# Patient Record
Sex: FEMALE | Race: WHITE | NOT HISPANIC OR LATINO | Employment: FULL TIME | URBAN - METROPOLITAN AREA
[De-identification: names, ages, dates, MRNs, and addresses within clinical notes are randomized per-mention and may not be internally consistent; named-entity substitution may affect disease eponyms.]

---

## 2016-07-14 LAB
EXTERNAL ANTIBODY SCREEN: NEGATIVE
EXTERNAL HEPATITIS B SURFACE ANTIGEN: NEGATIVE
EXTERNAL RUBELLA QUALITATIVE: NORMAL
EXTERNAL SYPHILIS RPR SCREEN: NORMAL
HIV1 AB SPEC QL IA.RAPID: NEGATIVE

## 2016-12-29 LAB — EXTERNAL GROUP B STREP ANTIGEN: NEGATIVE

## 2017-01-03 ENCOUNTER — PROCEDURE VISIT (OUTPATIENT)
Dept: OBSTETRICS AND GYNECOLOGY | Facility: CLINIC | Age: 27
End: 2017-01-03

## 2017-01-03 ENCOUNTER — ROUTINE PRENATAL (OUTPATIENT)
Dept: OBSTETRICS AND GYNECOLOGY | Facility: CLINIC | Age: 27
End: 2017-01-03

## 2017-01-03 VITALS — DIASTOLIC BLOOD PRESSURE: 67 MMHG | SYSTOLIC BLOOD PRESSURE: 108 MMHG | WEIGHT: 167 LBS | BODY MASS INDEX: 29.58 KG/M2

## 2017-01-03 DIAGNOSIS — O35.10X0 CHROMOSOMAL ABNORMALITY IN FETUS AFFECTING CARE OF MOTHER, NOT APPLICABLE OR UNSPECIFIED FETUS: ICD-10-CM

## 2017-01-03 DIAGNOSIS — O36.5930 IUGR (INTRAUTERINE GROWTH RESTRICTION) AFFECTING CARE OF MOTHER, THIRD TRIMESTER, NOT APPLICABLE OR UNSPECIFIED FETUS: Primary | ICD-10-CM

## 2017-01-03 DIAGNOSIS — Z34.83 ENCOUNTER FOR SUPERVISION OF OTHER NORMAL PREGNANCY IN THIRD TRIMESTER: Primary | ICD-10-CM

## 2017-01-03 DIAGNOSIS — O36.5910 IUGR (INTRAUTERINE GROWTH RESTRICTION) AFFECTING CARE OF MOTHER, FIRST TRIMESTER, NOT APPLICABLE OR UNSPECIFIED FETUS: Primary | ICD-10-CM

## 2017-01-03 DIAGNOSIS — O36.5910 IUGR (INTRAUTERINE GROWTH RESTRICTION) AFFECTING CARE OF MOTHER, FIRST TRIMESTER, NOT APPLICABLE OR UNSPECIFIED FETUS: ICD-10-CM

## 2017-01-03 PROCEDURE — 76819 FETAL BIOPHYS PROFIL W/O NST: CPT | Performed by: OBSTETRICS & GYNECOLOGY

## 2017-01-03 PROCEDURE — 99213 OFFICE O/P EST LOW 20 MIN: CPT | Performed by: OBSTETRICS & GYNECOLOGY

## 2017-01-03 PROCEDURE — 76820 UMBILICAL ARTERY ECHO: CPT | Performed by: OBSTETRICS & GYNECOLOGY

## 2017-01-03 RX ORDER — SODIUM CHLORIDE 0.9 % (FLUSH) 0.9 %
1-10 SYRINGE (ML) INJECTION AS NEEDED
Status: CANCELLED | OUTPATIENT
Start: 2017-01-03

## 2017-01-03 RX ORDER — METHYLERGONOVINE MALEATE 0.2 MG/ML
200 INJECTION INTRAVENOUS AS NEEDED
Status: CANCELLED | OUTPATIENT
Start: 2017-01-03

## 2017-01-03 RX ORDER — CARBOPROST TROMETHAMINE 250 UG/ML
250 INJECTION, SOLUTION INTRAMUSCULAR AS NEEDED
Status: CANCELLED | OUTPATIENT
Start: 2017-01-03

## 2017-01-03 RX ORDER — MISOPROSTOL 100 UG/1
800 TABLET ORAL AS NEEDED
Status: CANCELLED | OUTPATIENT
Start: 2017-01-03

## 2017-01-03 RX ORDER — LIDOCAINE HYDROCHLORIDE 10 MG/ML
5 INJECTION, SOLUTION INFILTRATION; PERINEURAL AS NEEDED
Status: CANCELLED | OUTPATIENT
Start: 2017-01-03

## 2017-01-03 RX ORDER — MISOPROSTOL 100 UG/1
25 TABLET ORAL
Status: CANCELLED | OUTPATIENT
Start: 2017-01-03 | End: 2017-01-03

## 2017-01-03 NOTE — MR AVS SNAPSHOT
Citlalli Jennifer   1/3/2017 11:15 AM   Routine Prenatal    Dept Phone:  [phone]   Encounter #:  [id number]    Provider:  Baljit Wiseman MD   Department:  [facility]                Your Full Care Plan              Your Updated Medication List          This list is accurate as of: 1/3/17  4:51 PM.  Always use your most recent med list.                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, not applicable or unspecified fetus     ICD-10-CM: O35.1XX0  ICD-9-CM: 655.10     IUGR (intrauterine growth restriction) affecting care of mother, first trimester, not applicable or unspecified fetus     ICD-10-CM: O36.5910  ICD-9-CM: 656.53       Instructions     None    Patient Instructions History      Upcoming Appointments     Visit Type Date Time Department    OB FOLLOWUP 1/3/2017 11:15 AM [facility] [place]    ULTRASOUND 1/3/2017 10:40 AM [facility] LOBGYN [place]    LABOR INDUCTION 2017 12:00 AM LEAH WRIGHT      [employer] Signup     [facility] [email or web address] allows you to send messages to your doctor, view your test results, renew your prescriptions, schedule appointments, and more. To sign up, go to [employer] and click on the Sign Up Now link in the New User? box. Enter your [email or web address] Activation Code exactly as it appears below along with the last four digits of your Social Security Number and your Date of Birth () to complete the sign-up process. If you do not sign up before the expiration date, you must request a new code.    [email or web address] Activation Code:  [email or web address]  Expires: 1/5/2017  4:02 PM    If you have questions, you can email [facility], [email or web address] or call [phone] to talk to our [employer] cell [employer] staff. Remember, [employer] is NOT to be used for urgent needs. For medical emergencies, dial 911.               Other Info from Your Visit           Your Appointments     Jan 06, 2017 12:00 AM EST   LABOR INDUCTION with ANDREY LD INDUCTION ROOM   [facility] ([place])    [address] [facility] [address] [phone]              Allergies     Penicillins  Itching, Rash    Phenergan [Promethazine]  Itching, Rash    Promethazine Hcl  Nausea And Vomiting, Rash      Reason for Visit     Routine Prenatal Visit           Vital Signs     Blood Pressure Weight Body Mass Index Smoking Status          108/67 167 lb (75.8 kg) 29.58 kg/m2 Never Smoker        Problems and Diagnoses Noted     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

## 2017-01-03 NOTE — PROGRESS NOTES
Ob follow up    Citlalli Rodriguez is a 26 y.o.  36w5d patient being seen today for her obstetrical visit. Patient reports no complaints. Fetal movement: normal.      ROS - Denies leaking fluid, vaginal bleeding and notes good fetal movement.     Visit Vitals   • /67   • Wt 167 lb (75.8 kg)   • BMI 29.58 kg/m2       FHT:  143 BPM    Uterine Size: size less than dates   Presentations: cephalic   Pelvic Exam:     Dilation: 1cm    Effacement: 25%    Station:  -2                 Assessment    1) Pregnancy at 36w5d  2) Fetal status reassuring   3) GBS status - negative  4) IUGR   BPP, EAMON and dopplers normal today.   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   6) glucosuria not today.   HgA1c was 5.6 which is top normal number.       Plan    Labor warnings   [facility]        Baljit Wiseman MD   1/3/2017  11:18 AM

## 2017-01-03 NOTE — MR AVS SNAPSHOT
Citlalli Jennifer   1/3/2017 10:40 AM   Appointment    Dept Phone:  [phone]   Encounter #:  [id number]    Provider:  ULTRASOUND LOBGYN [place]   Department:  [facility]                Your Full Care Plan              Your Updated Medication List          This list is accurate as of: 1/3/17 10:39 AM.  Always use your most recent med list.                atenolol 50 MG tablet   Commonly known as:  TENORMIN       PRENATAL 28-0.8 MG tablet       raNITIdine 150 MG tablet   Commonly known as:  ZANTAC               Instructions     None    Patient Instructions History      Upcoming Appointments     Visit Type Date Time Department    OB FOLLOWUP 1/3/2017 11:15 AM [facility]    ULTRASOUND 1/3/2017 10:40 AM [facility]      [facility] Signup     [employer] allows you to send messages to your doctor, view your test results, renew your prescriptions, schedule appointments, and more. To sign up, go to [employer] and click on the Sign Up Now link in the New User? box. Enter your [employer] Activation Code exactly as it appears below along with the last four digits of your Social Security Number and your Date of Birth () to complete the sign-up process. If you do not sign up before the expiration date, you must request a new code.    [employer] Activation Code: [email or web address]  Expires: 2017  4:02 PM    If you have questions, you can email [email or web address] or call [phone] to talk to our [employer] staff. Remember, [employer] is NOT to be used for urgent needs. For medical emergencies, dial 911.               Other Info from Your Visit           Your Appointments     2017 10:40 AM EST   Ultrasound with ULTRASOUND LOBGYN [place]   [facility] (--)    [address]  [facility] [address]   [phone]            2017 11:15 AM EST   OB FOLLOWUP with Baljit Wiseman MD      [facility] (--)    [address]  [facility] [address] [phone]              Allergies     Penicillins  Itching, Rash    Phenergan [Promethazine]  Itching, Rash      Vital Signs     Smoking Status                   Never Smoker

## 2017-01-06 ENCOUNTER — ANESTHESIA (OUTPATIENT)
Dept: LABOR AND DELIVERY | Facility: HOSPITAL | Age: 27
End: 2017-01-06

## 2017-01-06 ENCOUNTER — HOSPITAL ENCOUNTER (INPATIENT)
Dept: LABOR AND DELIVERY | Facility: HOSPITAL | Age: 27
LOS: 2 days | Discharge: HOME | End: 2017-01-08
Attending: OBSTETRICS & GYNECOLOGY | Admitting: OBSTETRICS & GYNECOLOGY

## 2017-01-06 ENCOUNTER — ANESTHESIA EVENT (OUTPATIENT)
Dept: LABOR AND DELIVERY | Facility: HOSPITAL | Age: 27
End: 2017-01-06

## 2017-01-06 DIAGNOSIS — O36.5910 IUGR (INTRAUTERINE GROWTH RESTRICTION) AFFECTING CARE OF MOTHER, FIRST TRIMESTER, NOT APPLICABLE OR UNSPECIFIED FETUS: ICD-10-CM

## 2017-01-06 DIAGNOSIS — O36.5930 IUGR (INTRAUTERINE GROWTH RESTRICTION) AFFECTING CARE OF MOTHER, THIRD TRIMESTER, NOT APPLICABLE OR UNSPECIFIED FETUS: Primary | ICD-10-CM

## 2017-01-06 LAB
ABO GROUP BLD: NORMAL
ABO GROUP BLD: NORMAL
BASOPHILS # BLD AUTO: 0.01 10*3/MM3 (ref 0–0.2)
BASOPHILS NFR BLD AUTO: 0.1 % (ref 0–1.5)
BLD GP AB SCN SERPL QL: POSITIVE
DEPRECATED RDW RBC AUTO: 44.2 FL (ref 37–54)
EOSINOPHIL # BLD AUTO: 0.05 10*3/MM3 (ref 0–0.7)
EOSINOPHIL NFR BLD AUTO: 0.5 % (ref 0.3–6.2)
ERYTHROCYTE [DISTWIDTH] IN BLOOD BY AUTOMATED COUNT: 13.5 % (ref 11.7–13)
EXTERNAL ABO GROUPING: NORMAL
EXTERNAL RH FACTOR: NEGATIVE
HCT VFR BLD AUTO: 32.7 % (ref 35.6–45.5)
HGB BLD-MCNC: 10.3 G/DL (ref 11.9–15.5)
IMM GRANULOCYTES # BLD: 0.09 10*3/MM3 (ref 0–0.03)
IMM GRANULOCYTES NFR BLD: 0.9 % (ref 0–0.5)
LYMPHOCYTES # BLD AUTO: 2.52 10*3/MM3 (ref 0.9–4.8)
LYMPHOCYTES NFR BLD AUTO: 25.6 % (ref 19.6–45.3)
MCH RBC QN AUTO: 28.1 PG (ref 26.9–32)
MCHC RBC AUTO-ENTMCNC: 31.5 G/DL (ref 32.4–36.3)
MCV RBC AUTO: 89.1 FL (ref 80.5–98.2)
MONOCYTES # BLD AUTO: 0.68 10*3/MM3 (ref 0.2–1.2)
MONOCYTES NFR BLD AUTO: 6.9 % (ref 5–12)
NEUTROPHILS # BLD AUTO: 6.49 10*3/MM3 (ref 1.9–8.1)
NEUTROPHILS NFR BLD AUTO: 66 % (ref 42.7–76)
PLATELET # BLD AUTO: 252 10*3/MM3 (ref 140–500)
PMV BLD AUTO: 9 FL (ref 6–12)
RBC # BLD AUTO: 3.67 10*6/MM3 (ref 3.9–5.2)
RESIDUAL RHIG DETECTED: NORMAL
RH BLD: NEGATIVE
RH BLD: NEGATIVE
WBC NRBC COR # BLD: 9.84 10*3/MM3 (ref 4.5–10.7)

## 2017-01-06 PROCEDURE — 3E033VJ INTRODUCTION OF OTHER HORMONE INTO PERIPHERAL VEIN, PERCUTANEOUS APPROACH: ICD-10-PCS | Performed by: OBSTETRICS & GYNECOLOGY

## 2017-01-06 PROCEDURE — 86850 RBC ANTIBODY SCREEN: CPT

## 2017-01-06 PROCEDURE — 86870 RBC ANTIBODY IDENTIFICATION: CPT

## 2017-01-06 PROCEDURE — 86901 BLOOD TYPING SEROLOGIC RH(D): CPT

## 2017-01-06 PROCEDURE — 86900 BLOOD TYPING SEROLOGIC ABO: CPT

## 2017-01-06 PROCEDURE — 10907ZC DRAINAGE OF AMNIOTIC FLUID, THERAPEUTIC FROM PRODUCTS OF CONCEPTION, VIA NATURAL OR ARTIFICIAL OPENING: ICD-10-PCS | Performed by: OBSTETRICS & GYNECOLOGY

## 2017-01-06 PROCEDURE — C1755 CATHETER, INTRASPINAL: HCPCS

## 2017-01-06 PROCEDURE — 85025 COMPLETE CBC W/AUTO DIFF WBC: CPT | Performed by: OBSTETRICS & GYNECOLOGY

## 2017-01-06 PROCEDURE — 59409 OBSTETRICAL CARE: CPT | Performed by: OBSTETRICS & GYNECOLOGY

## 2017-01-06 RX ORDER — IBUPROFEN 600 MG/1
600 TABLET ORAL EVERY 8 HOURS PRN
Status: DISCONTINUED | OUTPATIENT
Start: 2017-01-06 | End: 2017-01-08 | Stop reason: HOSPADM

## 2017-01-06 RX ORDER — SODIUM CHLORIDE, SODIUM LACTATE, POTASSIUM CHLORIDE, CALCIUM CHLORIDE 600; 310; 30; 20 MG/100ML; MG/100ML; MG/100ML; MG/100ML
125 INJECTION, SOLUTION INTRAVENOUS CONTINUOUS
Status: DISCONTINUED | OUTPATIENT
Start: 2017-01-06 | End: 2017-01-08 | Stop reason: HOSPADM

## 2017-01-06 RX ORDER — DOCUSATE SODIUM 100 MG/1
100 CAPSULE, LIQUID FILLED ORAL 2 TIMES DAILY
Status: DISCONTINUED | OUTPATIENT
Start: 2017-01-06 | End: 2017-01-08 | Stop reason: HOSPADM

## 2017-01-06 RX ORDER — FAMOTIDINE 10 MG/ML
20 INJECTION, SOLUTION INTRAVENOUS ONCE AS NEEDED
Status: DISCONTINUED | OUTPATIENT
Start: 2017-01-06 | End: 2017-01-06 | Stop reason: HOSPADM

## 2017-01-06 RX ORDER — SODIUM CHLORIDE 0.9 % (FLUSH) 0.9 %
1-10 SYRINGE (ML) INJECTION AS NEEDED
Status: DISCONTINUED | OUTPATIENT
Start: 2017-01-06 | End: 2017-01-06 | Stop reason: HOSPADM

## 2017-01-06 RX ORDER — METHYLERGONOVINE MALEATE 0.2 MG/ML
200 INJECTION INTRAVENOUS AS NEEDED
Status: DISCONTINUED | OUTPATIENT
Start: 2017-01-06 | End: 2017-01-06 | Stop reason: HOSPADM

## 2017-01-06 RX ORDER — LANOLIN 100 %
OINTMENT (GRAM) TOPICAL
Status: DISCONTINUED | OUTPATIENT
Start: 2017-01-06 | End: 2017-01-08 | Stop reason: HOSPADM

## 2017-01-06 RX ORDER — LIDOCAINE HYDROCHLORIDE AND EPINEPHRINE 15; 5 MG/ML; UG/ML
INJECTION, SOLUTION EPIDURAL AS NEEDED
Status: DISCONTINUED | OUTPATIENT
Start: 2017-01-06 | End: 2017-01-06 | Stop reason: SURG

## 2017-01-06 RX ORDER — ONDANSETRON 4 MG/1
4 TABLET, FILM COATED ORAL EVERY 8 HOURS PRN
Status: DISCONTINUED | OUTPATIENT
Start: 2017-01-06 | End: 2017-01-08 | Stop reason: HOSPADM

## 2017-01-06 RX ORDER — BISACODYL 10 MG
10 SUPPOSITORY, RECTAL RECTAL DAILY PRN
Status: DISCONTINUED | OUTPATIENT
Start: 2017-01-07 | End: 2017-01-08 | Stop reason: HOSPADM

## 2017-01-06 RX ORDER — CARBOPROST TROMETHAMINE 250 UG/ML
250 INJECTION, SOLUTION INTRAMUSCULAR AS NEEDED
Status: DISCONTINUED | OUTPATIENT
Start: 2017-01-06 | End: 2017-01-06 | Stop reason: HOSPADM

## 2017-01-06 RX ORDER — MISOPROSTOL 200 UG/1
800 TABLET ORAL AS NEEDED
Status: DISCONTINUED | OUTPATIENT
Start: 2017-01-06 | End: 2017-01-06 | Stop reason: HOSPADM

## 2017-01-06 RX ORDER — LIDOCAINE HYDROCHLORIDE 10 MG/ML
5 INJECTION, SOLUTION INFILTRATION; PERINEURAL AS NEEDED
Status: DISCONTINUED | OUTPATIENT
Start: 2017-01-06 | End: 2017-01-06 | Stop reason: HOSPADM

## 2017-01-06 RX ORDER — OXYTOCIN/RINGER'S LACTATE 10/500ML
2 PLASTIC BAG, INJECTION (ML) INTRAVENOUS
Status: DISCONTINUED | OUTPATIENT
Start: 2017-01-06 | End: 2017-01-08 | Stop reason: HOSPADM

## 2017-01-06 RX ORDER — SODIUM CHLORIDE 0.9 % (FLUSH) 0.9 %
1-10 SYRINGE (ML) INJECTION AS NEEDED
Status: DISCONTINUED | OUTPATIENT
Start: 2017-01-06 | End: 2017-01-08 | Stop reason: HOSPADM

## 2017-01-06 RX ORDER — MISOPROSTOL 200 UG/1
600 TABLET ORAL ONCE
Status: DISCONTINUED | OUTPATIENT
Start: 2017-01-06 | End: 2017-01-08 | Stop reason: HOSPADM

## 2017-01-06 RX ORDER — ZOLPIDEM TARTRATE 5 MG/1
5 TABLET ORAL NIGHTLY PRN
Status: DISCONTINUED | OUTPATIENT
Start: 2017-01-06 | End: 2017-01-08 | Stop reason: HOSPADM

## 2017-01-06 RX ORDER — ATENOLOL 50 MG/1
50 TABLET ORAL DAILY
Status: DISCONTINUED | OUTPATIENT
Start: 2017-01-07 | End: 2017-01-08 | Stop reason: HOSPADM

## 2017-01-06 RX ORDER — MISOPROSTOL 100 MCG
25 TABLET ORAL
Status: DISCONTINUED | OUTPATIENT
Start: 2017-01-06 | End: 2017-01-06

## 2017-01-06 RX ORDER — PRENATAL VIT NO.126/IRON/FOLIC 28MG-0.8MG
1 TABLET ORAL DAILY
Status: DISCONTINUED | OUTPATIENT
Start: 2017-01-07 | End: 2017-01-08 | Stop reason: HOSPADM

## 2017-01-06 RX ORDER — DIPHENHYDRAMINE HYDROCHLORIDE 50 MG/ML
12.5 INJECTION INTRAMUSCULAR; INTRAVENOUS EVERY 8 HOURS PRN
Status: DISCONTINUED | OUTPATIENT
Start: 2017-01-06 | End: 2017-01-06 | Stop reason: HOSPADM

## 2017-01-06 RX ORDER — HYDROCODONE BITARTRATE AND ACETAMINOPHEN 5; 325 MG/1; MG/1
2 TABLET ORAL EVERY 6 HOURS PRN
Status: DISCONTINUED | OUTPATIENT
Start: 2017-01-06 | End: 2017-01-08 | Stop reason: HOSPADM

## 2017-01-06 RX ORDER — FAMOTIDINE 20 MG/1
20 TABLET, FILM COATED ORAL DAILY
Status: DISCONTINUED | OUTPATIENT
Start: 2017-01-07 | End: 2017-01-08 | Stop reason: HOSPADM

## 2017-01-06 RX ORDER — OXYTOCIN/RINGER'S LACTATE 10/500ML
125 PLASTIC BAG, INJECTION (ML) INTRAVENOUS CONTINUOUS
Status: DISCONTINUED | OUTPATIENT
Start: 2017-01-06 | End: 2017-01-08 | Stop reason: HOSPADM

## 2017-01-06 RX ORDER — ONDANSETRON 2 MG/ML
4 INJECTION INTRAMUSCULAR; INTRAVENOUS ONCE AS NEEDED
Status: DISCONTINUED | OUTPATIENT
Start: 2017-01-06 | End: 2017-01-06 | Stop reason: HOSPADM

## 2017-01-06 RX ORDER — FAMOTIDINE 10 MG/ML
20 INJECTION, SOLUTION INTRAVENOUS 2 TIMES DAILY
Status: DISCONTINUED | OUTPATIENT
Start: 2017-01-06 | End: 2017-01-08 | Stop reason: HOSPADM

## 2017-01-06 RX ORDER — MISOPROSTOL 200 UG/1
600 TABLET ORAL AS NEEDED
Status: DISCONTINUED | OUTPATIENT
Start: 2017-01-06 | End: 2017-01-08 | Stop reason: HOSPADM

## 2017-01-06 RX ORDER — OXYTOCIN/RINGER'S LACTATE 10/500ML
PLASTIC BAG, INJECTION (ML) INTRAVENOUS
Status: COMPLETED
Start: 2017-01-06 | End: 2017-01-06

## 2017-01-06 RX ADMIN — Medication 10 ML/HR: at 11:58

## 2017-01-06 RX ADMIN — BENZOCAINE AND MENTHOL: 20; .5 SPRAY TOPICAL at 21:25

## 2017-01-06 RX ADMIN — FAMOTIDINE 20 MG: 10 INJECTION, SOLUTION INTRAVENOUS at 03:30

## 2017-01-06 RX ADMIN — LIDOCAINE HYDROCHLORIDE 3 ML: 15 INJECTION, SOLUTION EPIDURAL; INFILTRATION; INTRACAUDAL; PERINEURAL at 18:12

## 2017-01-06 RX ADMIN — LIDOCAINE HYDROCHLORIDE 4 ML: 15 INJECTION, SOLUTION EPIDURAL; INFILTRATION; INTRACAUDAL; PERINEURAL at 18:14

## 2017-01-06 RX ADMIN — OXYTOCIN 125 ML/HR: 10 INJECTION, SOLUTION INTRAMUSCULAR; INTRAVENOUS at 19:20

## 2017-01-06 RX ADMIN — LIDOCAINE HYDROCHLORIDE AND EPINEPHRINE 3 ML: 15; 5 INJECTION, SOLUTION EPIDURAL at 11:54

## 2017-01-06 RX ADMIN — HYDROCODONE BITARTRATE AND ACETAMINOPHEN 2 TABLET: 5; 325 TABLET ORAL at 21:24

## 2017-01-06 RX ADMIN — MISOPROSTOL 600 MCG: 200 TABLET ORAL at 18:54

## 2017-01-06 RX ADMIN — SODIUM CHLORIDE, POTASSIUM CHLORIDE, SODIUM LACTATE AND CALCIUM CHLORIDE 125 ML/HR: 600; 310; 30; 20 INJECTION, SOLUTION INTRAVENOUS at 16:53

## 2017-01-06 RX ADMIN — MISOPROSTOL 25 MCG: 100 TABLET ORAL at 03:03

## 2017-01-06 RX ADMIN — OXYTOCIN 2 MILLI-UNITS/MIN: 10 INJECTION, SOLUTION INTRAMUSCULAR; INTRAVENOUS at 14:07

## 2017-01-06 RX ADMIN — Medication 2 MILLI-UNITS/MIN: at 14:07

## 2017-01-06 RX ADMIN — LIDOCAINE HYDROCHLORIDE 3 ML: 15 INJECTION, SOLUTION EPIDURAL; INFILTRATION; INTRACAUDAL; PERINEURAL at 18:10

## 2017-01-06 RX ADMIN — SODIUM CHLORIDE, POTASSIUM CHLORIDE, SODIUM LACTATE AND CALCIUM CHLORIDE 125 ML/HR: 600; 310; 30; 20 INJECTION, SOLUTION INTRAVENOUS at 10:39

## 2017-01-06 RX ADMIN — SODIUM CHLORIDE, POTASSIUM CHLORIDE, SODIUM LACTATE AND CALCIUM CHLORIDE 125 ML/HR: 600; 310; 30; 20 INJECTION, SOLUTION INTRAVENOUS at 03:00

## 2017-01-06 RX ADMIN — PRAMOXINE HYDROCHLORIDE AND HYDROCORTISONE ACETATE: 100; 100 AEROSOL, FOAM TOPICAL at 21:25

## 2017-01-06 RX ADMIN — SODIUM CHLORIDE, POTASSIUM CHLORIDE, SODIUM LACTATE AND CALCIUM CHLORIDE 125 ML/HR: 600; 310; 30; 20 INJECTION, SOLUTION INTRAVENOUS at 13:56

## 2017-01-06 RX ADMIN — IBUPROFEN 600 MG: 600 TABLET ORAL at 19:59

## 2017-01-06 RX ADMIN — MISOPROSTOL 25 MCG: 100 TABLET ORAL at 07:00

## 2017-01-06 NOTE — ANESTHESIA PROCEDURE NOTES
Labor Epidural    Patient location during procedure: OB  Start Time: 1/6/2017 11:43 AM  Indication:at surgeon's request  Performed By  Anesthesiologist: JACKELINE SAHNI  Preanesthetic Checklist  Completed: patient identified, site marked, surgical consent, pre-op evaluation, timeout performed, IV checked, risks and benefits discussed and monitors and equipment checked  Epidural Block Prep:  Pt Position:sitting  Sterile Tech:cap, gloves, mask and sterile barrier  Monitoring:blood pressure monitoring, continuous pulse oximetry and EKG  Epidural Block Procedure:  Approach:midline  Location:L3-L4  Needle Type:Tuohy  Needle Gauge:17 G  Loss of Resistance: 8cm  Cath Depth at skin:13 cm  Paresthesia: left and transient  Aspiration:negative  Test Dose:negative  Post Assessment:  Dressing:occlusive dressing applied and secured with tape  Pt Tolerance:patient tolerated the procedure well with no apparent complications  Complications:no

## 2017-01-06 NOTE — H&P
[facility]  Obstetric History and Physical    Chief Complaint   Patient presents with   • Scheduled Induction           Patient is a 26 y.o. female  currently at 37w1d, who presents for induction with IUGR.    Her prenatal care is complicated by  abnormal fetal growth  IUGR.  Her previous obstetric/gynecological history is noted for abnormal CVS.       External Prenatal Results         Pregnancy Outside Results - these were transcribed from office records.  See scanned records for details. Date Time   Hgb      Hct      ABO ^ O  17    Rh ^ Negative  17    Antibody Screen ^ Negative  16    Glucose Fasting GTT      Glucose Tolerance Test 1 hour      Glucose Tolerance Test 3 hour      Gonorrhea (discrete)      Chlamydia (discrete)      RPR ^ Non-Reactive  16    VDRL      Syphillis Antibody      Rubella ^ Immune  16    HBsAg ^ Negative  16    Herpes Simplex Virus PCR      Herpes Simplex VIrus Culture      HIV ^ Negative  16    Hep C RNA Quant PCR      Hep C Antibody      Urine Drug Screen      AFP      Group B Strep ^ Negative  16    GBS Susceptibility to Clindamycin      GBS Susceptibility to Eythromycin      Fetal Fibronectin      Genetic Testing, Maternal Blood             Legend: ^: Historical             Obstetric History       T1      TAB0   SAB1   E0   M0   L1       # Outcome Date GA Lbr Larry/2nd Weight Sex Delivery Anes PTL Lv   3 Current            2 Term 10/21/13 39w0d  6 lb 7 oz (2.92 kg) M Vag-Spont EPI N Y   1 SAB                       Past Medical History   Diagnosis Date   • Abnormal Pap smear of cervix    • Chlamydia    • HPV (human papilloma virus) infection    • SVT (supraventricular tachycardia)    • Varicella           Past Surgical History   Procedure Laterality Date   • Gynecologic cryosurgery     • [place] tooth extraction            No current facility-administered medications on file prior to encounter.      Current Outpatient  Prescriptions on File Prior to Encounter   Medication Sig Dispense Refill   • atenolol (TENORMIN) 50 MG tablet Take 50 mg by mouth Daily.     • PRENATAL 28-0.8 MG tablet Take  by mouth.     • raNITIdine (ZANTAC) 150 MG tablet Take 150 mg by mouth 2 (Two) Times a Day.            Allergies   Allergen Reactions   • Penicillins Itching and Rash   • Phenergan [Promethazine] Itching and Rash   • Promethazine Hcl Nausea And Vomiting and Rash          Social History     Social History   • Marital status:      Spouse name: N/A   • Number of children: N/A   • Years of education: N/A     Occupational History   • Not on file.     Social History Main Topics   • Smoking status: Never Smoker   • Smokeless tobacco: Never Used   • Alcohol use No   • Drug use: No   • Sexual activity: Yes     Partners: Male     Other Topics Concern   • Not on file     Social History Narrative          Family History   Problem Relation Age of Onset   • No Known Problems Father    • No Known Problems Mother    • Breast cancer Neg Hx    • Ovarian cancer Neg Hx    • Uterine cancer Neg Hx    • Colon cancer Neg Hx    • Deep vein thrombosis Neg Hx    • Pulmonary embolism Neg Hx         Review of Systems   Constitutional: Negative for chills, fatigue and fever.   HENT: Negative for congestion.    Eyes: Negative for visual disturbance.   Respiratory: Negative for shortness of breath.    Cardiovascular: Negative for chest pain.   Gastrointestinal: Negative for abdominal pain.   Endocrine: Negative for cold intolerance and heat intolerance.   Genitourinary: Negative for dysuria, vaginal bleeding and vaginal discharge.   Musculoskeletal: Negative for arthralgias.   Skin: Negative for rash.   Allergic/Immunologic: Negative for immunocompromised state.   Neurological: Negative for dizziness.   Hematological: Negative for adenopathy.   Psychiatric/Behavioral: Negative for behavioral problems.       Visit Vitals   • /72   • Pulse 66   • Temp 97.5 °F  "(36.4 °C) (Oral)   • Resp 18   • Ht 63\" (160 cm)   • Wt 170 lb (77.1 kg)   • SpO2 100%   • Breastfeeding Yes   • BMI 30.11 kg/m2       Physical Exam   Constitutional: She is oriented to person, place, and time. She appears well-developed and well-nourished.   HENT:   Head: Normocephalic and atraumatic.   Eyes: Conjunctivae are normal.   Neck: Normal range of motion. Neck supple. No thyromegaly present.   Cardiovascular: Normal rate and regular rhythm.    No murmur heard.  Pulmonary/Chest: Effort normal and breath sounds normal.   Abdominal: Soft. Bowel sounds are normal. She exhibits no distension. There is no tenderness.   Genitourinary: Uterus is enlarged (efw 5#).   Musculoskeletal: She exhibits no edema.   Neurological: She is alert and oriented to person, place, and time.   Skin: No rash noted.   Psychiatric: She has a normal mood and affect. Her behavior is normal.       Presentation: cephalic   Cervix: Exam by: Method: sterile exam per RN   Dilation: Dilation: 1   Effacement: Cervical Effacement: 50%   Station: Station: -2     FHT - Reassuring class 1   Rudolph - q 2 min     Lab Results   Component Value Date    WBC 9.84 01/06/2017    HGB 10.3 (L) 01/06/2017    HCT 32.7 (L) 01/06/2017    MCV 89.1 01/06/2017     01/06/2017       Active Problems:    IUGR (intrauterine growth restriction) affecting care of mother      Assessment & Plan    Assessment:  1.  Intrauterine pregnancy at 37w1d weeks gestation with reactive fetal status.    2.  induction of labor  for IUGR  with some cervical ripening   3.  Obstetrical history significant for is non-contributory.  4.  GBS status: .negative  5.  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       Plan:  1. fetal and uterine monitoring  continuously, cervical ripening with  " Misoprostol, labor augmentation  Pitocin and analgesia with  epidural  2. Plan of care has been reviewed with patient and   3.  Risks, benefits of treatment plan have been discussed.  4.  All questions have been answered.        Baljit Wiseman MD  1/6/2017  8:16 AM

## 2017-01-06 NOTE — PROGRESS NOTES
OB Note    AROM with clear fluid  Cervix 70/1-2.  IUPC placed without difficulty  Begin pitocin  Fetal status reassuring    Roger Laurent MD

## 2017-01-06 NOTE — PLAN OF CARE
Problem: Patient Care Overview (Adult)  Goal: Plan of Care Review  Outcome: Ongoing (interventions implemented as appropriate)    01/06/17 1555   Coping/Psychosocial Response Interventions   Plan Of Care Reviewed With patient   Patient Care Overview   Progress improving       Goal: Adult Individualization and Mutuality  Outcome: Ongoing (interventions implemented as appropriate)    01/06/17 0234   Individualization   Patient Specific Goals go natural if possible       Goal: Discharge Needs Assessment  Outcome: Ongoing (interventions implemented as appropriate)    01/06/17 1555   Discharge Needs Assessment   Concerns To Be Addressed no discharge needs identified         Problem: Labor (Cervical Ripen, Induct, Augment) (Adult,Obstetrics,Pediatric)  Goal: Signs and Symptoms of Listed Potential Problems Will be Absent or Manageable (Labor)  Outcome: Ongoing (interventions implemented as appropriate)    01/06/17 1555   Labor (Cervical Ripen, Induct, Augment)   Problems Assessed (Labor) all   Problems Present (Labor) none

## 2017-01-06 NOTE — ANESTHESIA PREPROCEDURE EVALUATION
Anesthesia Evaluation     Patient summary reviewed and Nursing notes reviewed    No history of anesthetic complications   Airway   Mallampati: II  TM distance: >3 FB  Neck ROM: full  no difficulty expected  Dental - normal exam     Pulmonary - negative pulmonary ROS and normal exam    breath sounds clear to auscultation  Cardiovascular - normal exam  (+) dysrhythmias (On atenolol) Tachycardia,     Rhythm: regular  Rate: normal    Neuro/Psych- negative ROS  GI/Hepatic/Renal/Endo - negative ROS     Musculoskeletal (-) negative ROS    Abdominal  - normal exam   Substance History - negative use     OB/GYN    (+) Pregnant,         Other - negative ROS                            Anesthesia Plan    ASA 2     Anesthetic plan and risks discussed with patient.

## 2017-01-06 NOTE — PLAN OF CARE
Problem: Patient Care Overview (Adult)  Goal: Plan of Care Review  Outcome: Ongoing (interventions implemented as appropriate)    01/06/17 0234   Coping/Psychosocial Response Interventions   Plan Of Care Reviewed With patient;spouse   Patient Care Overview   Progress improving       Goal: Adult Individualization and Mutuality  Outcome: Ongoing (interventions implemented as appropriate)    01/06/17 0234   Individualization   Patient Specific Preferences breastfeeding   Patient Specific Goals go natural if possible         Problem: Labor (Cervical Ripen, Induct, Augment) (Adult,Obstetrics,Pediatric)  Goal: Signs and Symptoms of Listed Potential Problems Will be Absent or Manageable (Labor)  Outcome: Ongoing (interventions implemented as appropriate)    01/06/17 0234   Labor (Cervical Ripen, Induct, Augment)   Problems Assessed (Labor) all   Problems Present (Labor) none

## 2017-01-06 NOTE — IP AVS SNAPSHOT
AFTER VISIT SUMMARY             Citlalli Rodriguez           About your hospitalization     You were admitted on:  January 6, 2017 You last received care in the:  [address]       Procedures & Surgeries         Medications    If you or your caregiver advised us that you are currently taking a medication and that medication is marked below as “Resume”, this simply indicates that we have reviewed those medications to make sure our new therapy recommendations do not interfere.  If you have concerns about medications other than those new ones which we are prescribing today, please consult the physician who prescribed them (or your primary physician).  Our review of your home medications is not meant to indicate that we are directing their use.             Your Medications      START taking these medications     HYDROcodone-acetaminophen 5-325 MG per tablet   Take 2 tablets by mouth Every 6 (Six) Hours As Needed for moderate pain (4-6) for up to 8 days.   Last time this was given:  1/6/2017  9:24 PM   Commonly known as:  NORCO             CONTINUE taking these medications     atenolol 50 MG tablet   Take 50 mg by mouth Daily.   Last time this was given:  1/7/2017  8:53 PM   Commonly known as:  TENORMIN           PRENATAL 28-0.8 MG tablet   Take  by mouth.   Last time this was given:  1/7/2017 11:49 AM           raNITIdine 150 MG tablet   Take 150 mg by mouth 2 (Two) Times a Day.   Commonly known as:  ZANTAC                Where to Get Your Medications      You can get these medications from any pharmacy     Bring a paper prescription for each of these medications     HYDROcodone-acetaminophen 5-325 MG per tablet                  Your Medications      Your Medication List           Morning Noon Evening Bedtime As Needed    atenolol 50 MG tablet   Take 50 mg by mouth Daily.   Commonly known as:  TENORMIN                                HYDROcodone-acetaminophen 5-325 MG per tablet   Take 2 tablets by mouth  Every 6 (Six) Hours As Needed for moderate pain (4-6) for up to 8 days.   Commonly known as:  NORCO                                PRENATAL 28-0.8 MG tablet   Take  by mouth.                                raNITIdine 150 MG tablet   Take 150 mg by mouth 2 (Two) Times a Day.   Commonly known as:  ZANTAC                                         Instructions for After Discharge        Activity Instructions     Pelvic Rest                 Diet Instructions     Advance Diet As Tolerated                   Discharge Instructions       Pelvic rest for 6 weeks  Follow up in 6 weeks     Discharge References/Attachments     POSTPARTUM CARE AFTER VAGINAL DELIVERY (ENGLISH)    POSTPARTUM DEPRESSION AND BABY BLUES (ENGLISH)       Follow-ups for After Discharge        Follow-up Information     Follow up with Baljit Wiseman MD. Call in 6 week(s).    Specialties:  Obstetrics and Gynecology, Gynecology    Contact information:    [address] [place] [address]  [phone]        [employer] Signup     [email or web address] allows you to send messages to your doctor, view your test results, renew your prescriptions, schedule appointments, and more. To sign up, go to [employer] and click on the Sign Up Now link in the New User? box. Enter your [employer] Activation Code exactly as it appears below along with the last four digits of your Social Security Number and your Date of Birth () to complete the sign-up process. If you do not sign up before the expiration date, you must request a new code.    [employer] Activation Code: [email or web address]  Expires: 2017  6:07 PM    If you have questions, you can email [email or web address] or call [phone] to talk to our [employer] staff. Remember, [employer] is NOT to be used for urgent needs. For medical emergencies, dial 911.           Summary of Your Hospitalization        Reason for Hospitalization     Your primary diagnosis was:  Not on File    Your  diagnoses also included:  Intrauterine Growth Restriction Affecting Care Of Mother      Care Providers     Provider Service Role Specialty    Baljit Wiseman MD Obstetrics Attending Provider Obstetrics and Gynecology      Your Allergies  Date Reviewed: 2017    Allergen Reactions    Penicillins Itching  Rash         Phenergan (Promethazine) Itching  Rash         Promethazine Hcl Nausea And Vomiting  Rash      Patient Belongings Returned     Document Return of Belongings Flowsheet     Were the patient bedside belongings sent home?   --   Belongings Retrieved from Security & Sent Home   --    Belongings Sent to Safe   --   Medications Retrieved from Pharmacy & Sent Home   --              More Information      Postpartum Care After Vaginal Delivery  After you deliver your  (postpartum period), the usual stay in the hospital is 24-72 hours. If there were problems with your labor or delivery, or if you have other medical problems, you might be in the hospital longer.   While you are in the hospital, you will receive help and instructions on how to care for yourself and your  during the postpartum period.   While you are in the hospital:  · Be sure to tell your nurses if you have pain or discomfort, as well as where you feel the pain and what makes the pain worse.  · If you had an incision made near your vagina (episiotomy) or if you had some tearing during delivery, the nurses may put ice packs on your episiotomy or tear. The ice packs may help to reduce the pain and swelling.  · If you are breastfeeding, you may feel uncomfortable contractions of your uterus for a couple of weeks. This is normal. The contractions help your uterus get back to normal size.  · It is normal to have some bleeding after delivery.    For the first 1-3 days after delivery, the flow is red and the amount may be similar to a period.    It is common for the flow to start and stop.    In the first few days, you may pass some  small clots. Let your nurses know if you begin to pass large clots or your flow increases.    Do not  flush blood clots down the toilet before having the nurse look at them.    During the next 3-10 days after delivery, your flow should become more watery and pink or brown-tinged in color.    Ten to fourteen days after delivery, your flow should be a small amount of yellowish-white discharge.    The amount of your flow will decrease over the first few weeks after delivery. Your flow may stop in 6-8 weeks. Most women have had their flow stop by 12 weeks after delivery.  · You should change your sanitary pads frequently.  · Wash your hands thoroughly with soap and water for at least 20 seconds after changing pads, using the toilet, or before holding or feeding your .  · You should feel like you need to empty your bladder within the first 6-8 hours after delivery.  · In case you become weak, lightheaded, or faint, call your nurse before you get out of bed for the first time and before you take a shower for the first time.  · Within the first few days after delivery, your breasts may begin to feel tender and full. This is called engorgement. Breast tenderness usually goes away within 48-72 hours after engorgement occurs. You may also notice milk leaking from your breasts. If you are not breastfeeding, do not stimulate your breasts. Breast stimulation can make your breasts produce more milk.  · Spending as much time as possible with your  is very important. During this time, you and your  can feel close and get to know each other. Having your  stay in your room (rooming in) will help to strengthen the bond with your .  It will give you time to get to know your  and become comfortable caring for your .  · Your hormones change after delivery. Sometimes the hormone changes can temporarily cause you to feel sad or tearful. These feelings should not last more than a few days. If  "these feelings last longer than that, you should talk to your caregiver.  · If desired, talk to your caregiver about methods of family planning or contraception.  · Talk to your caregiver about immunizations. Your caregiver may want you to have the following immunizations before leaving the hospital:    Tetanus, diphtheria, and pertussis (Tdap) or tetanus and diphtheria (Td) immunization. It is very important that you and your family (including grandparents) or others caring for your  are up-to-date with the Tdap or Td immunizations. The Tdap or Td immunization can help protect your  from getting ill.    Rubella immunization.    Varicella (chickenpox) immunization.    Influenza immunization. You should receive this annual immunization if you did not receive the immunization during your pregnancy.     This information is not intended to replace advice given to you by your health care provider. Make sure you discuss any questions you have with your health care provider.     Document Released: 10/14/2008 Document Revised: 2013 Document Reviewed: 2013  [employer] Interactive Patient Education ©6 [employer]          Postpartum Depression and Baby Blues  The postpartum period begins right after the birth of a baby. During this time, there is often a great amount of yandel and excitement. It is also a time of many changes in the life of the parents. Regardless of how many times a mother gives birth, each child brings new challenges and dynamics to the family. It is not unusual to have feelings of excitement along with confusing shifts in moods, emotions, and thoughts. All mothers are at risk of developing postpartum depression or the \"baby blues.\" These mood changes can occur right after giving birth, or they may occur many months after giving birth. The baby blues or postpartum depression can be mild or severe. Additionally, postpartum depression can go away rather quickly, or it can be a " long-term condition.   CAUSES  Raised hormone levels and the rapid drop in those levels are thought to be a main cause of postpartum depression and the baby blues. A number of hormones change during and after pregnancy. Estrogen and progesterone usually decrease right after the delivery of your baby. The levels of thyroid hormone and various cortisol steroids also rapidly drop. Other factors that play a role in these mood changes include major life events and genetics.   RISK FACTORS  If you have any of the following risks for the baby blues or postpartum depression, know what symptoms to watch out for during the postpartum period. Risk factors that may increase the likelihood of getting the baby blues or postpartum depression include:  · Having a personal or family history of depression.    · Having depression while being pregnant.    · Having premenstrual mood issues or mood issues related to oral contraceptives.  · Having a lot of life stress.    · Having marital conflict.    · Lacking a social support network.    · Having a baby with special needs.    · Having health problems, such as diabetes.    SIGNS AND SYMPTOMS  Symptoms of baby blues include:  · Brief changes in mood, such as going from extreme happiness to sadness.  · Decreased concentration.    · Difficulty sleeping.    · Crying spells, tearfulness.    · Irritability.    · Anxiety.    Symptoms of postpartum depression typically begin within the first month after giving birth. These symptoms include:  · Difficulty sleeping or excessive sleepiness.    · Marked weight loss.    · Agitation.    · Feelings of worthlessness.    · Lack of interest in activity or food.    Postpartum psychosis is a very serious condition and can be dangerous. Fortunately, it is rare. Displaying any of the following symptoms is cause for immediate medical attention. Symptoms of postpartum psychosis include:   · Hallucinations and delusions.    · Bizarre or disorganized behavior.     · Confusion or disorientation.    DIAGNOSIS   A diagnosis is made by an evaluation of your symptoms. There are no medical or lab tests that lead to a diagnosis, but there are various questionnaires that a health care provider may use to identify those with the baby blues, postpartum depression, or psychosis. Often, a screening tool called the Alcalde  Depression Scale is used to diagnose depression in the postpartum period.   TREATMENT  The baby blues usually goes away on its own in 1-2 weeks. Social support is often all that is needed. You will be encouraged to get adequate sleep and rest. Occasionally, you may be given medicines to help you sleep.   Postpartum depression requires treatment because it can last several months or longer if it is not treated. Treatment may include individual or group therapy, medicine, or both to address any social, physiological, and psychological factors that may play a role in the depression. Regular exercise, a healthy diet, rest, and social support may also be strongly recommended.   Postpartum psychosis is more serious and needs treatment right away. Hospitalization is often needed.  HOME CARE INSTRUCTIONS  · Get as much rest as you can. Nap when the baby sleeps.    · Exercise regularly. Some women find yoga and walking to be beneficial.    · Eat a balanced and nourishing diet.    · Do little things that you enjoy. Have a cup of tea, take a bubble bath, read your favorite magazine, or listen to your favorite music.  · Avoid alcohol.    · Ask for help with household chores, cooking, grocery shopping, or running errands as needed. Do not try to do everything.    · Talk to people close to you about how you are feeling. Get support from your partner, family members, friends, or other new moms.  · Try to stay positive in how you think. Think about the things you are grateful for.    · Do not spend a lot of time alone.    · Only take over-the-counter or prescription  medicine as directed by your health care provider.  · Keep all your postpartum appointments.    · Let your health care provider know if you have any concerns.    SEEK MEDICAL CARE IF:  You are having a reaction to or problems with your medicine.  SEEK IMMEDIATE MEDICAL CARE IF:  · You have suicidal feelings.    · You think you may harm the baby or someone else.  MAKE SURE YOU:  · Understand these instructions.  · Will watch your condition.  · Will get help right away if you are not doing well or get worse.     This information is not intended to replace advice given to you by your health care provider. Make sure you discuss any questions you have with your health care provider.     Document Released: 09/21/2005 Document Revised: 12/23/2014 Document Reviewed: 09/29/2014  [employer] Interactive Patient Education ©2016 [employer].            SYMPTOMS OF A STROKE    Call 911 or have someone take you to the Emergency Department if you have any of the following:    · Sudden numbness or weakness of your face, arm or leg especially on one side of the body  · Sudden confusion, diffiiculty speaking or trouble understanding   · Changes in your vision or loss of sight in one eye  · Sudden severe headache with no known cause  · sudden dizziness, trouble walking, loss of balance or coordination    It is important to seek emergency care right away if you have further stroke symptoms. If you get emergency help quickly, the powerful clot-dissolving medicines can reduce the disabilities caused by a stroke.     For more information:    American Stroke Association  3-469-8-STROKE  www.strokeassociation.org           IF YOU SMOKE OR USE TOBACCO PLEASE READ THE FOLLOWING:    Why is smoking bad for me?  Smoking increases the risk of heart disease, lung disease, vascular disease, stroke, and cancer.     If you smoke, STOP!    If you would like more information on quitting smoking, please visit the [employer] website:  [email or web address]/healthier-together/smoke   This link will provide additional resources including the QUIT line and the Beat the Pack support groups.     For more information:    American Cancer Society  (618) 425-3777    American Heart Association  1-330.890.8789               YOU ARE THE MOST IMPORTANT FACTOR IN YOUR RECOVERY.     Follow all instructions carefully.     I have reviewed my discharge instructions with my nurse, including the following information, if applicable:     Information about my illness and diagnosis   Follow up appointments (including lab draws)   Wound Care   Equipment Needs   Medications (new and continuing) along with side effects   Preventative information such as vaccines and smoking cessations   Diet   Pain   I know when to contact my Doctor's office or seek emergency care      I want my nurse to describe the side effects of my medications: YES NO   If the answer is no, I understand the side effects of my medications: YES NO   My nurse described the side effects of my medications in a way that I could understand: YES NO   I have taken my personal belongings and my own medications with me at discharge: YES NO            I have received this information and my questions have been answered. I have discussed any concerns I see with this plan with the nurse or physician. I understand these instructions.    Signature of Patient or Responsible Person: _____________________________________    Date: _________________  Time: __________________    Signature of Healthcare Provider: _______________________________________  Date: _________________  Time: __________________

## 2017-01-06 NOTE — PROGRESS NOTES
OB Note    Reviewed history with patient and .  Reviewed Dr Wiseman's notes/H&P  Discussed plan with patient  Consider amniotomy with pitocin  Fetal status reassuring overall    Roger Laurent MD

## 2017-01-07 LAB
BASOPHILS # BLD AUTO: 0.02 10*3/MM3 (ref 0–0.2)
BASOPHILS NFR BLD AUTO: 0.1 % (ref 0–1.5)
DEPRECATED RDW RBC AUTO: 44.2 FL (ref 37–54)
EOSINOPHIL # BLD AUTO: 0.03 10*3/MM3 (ref 0–0.7)
EOSINOPHIL NFR BLD AUTO: 0.2 % (ref 0.3–6.2)
ERYTHROCYTE [DISTWIDTH] IN BLOOD BY AUTOMATED COUNT: 13.8 % (ref 11.7–13)
HCT VFR BLD AUTO: 30 % (ref 35.6–45.5)
HGB BLD-MCNC: 9.3 G/DL (ref 11.9–15.5)
IMM GRANULOCYTES # BLD: 0.07 10*3/MM3 (ref 0–0.03)
IMM GRANULOCYTES NFR BLD: 0.5 % (ref 0–0.5)
LYMPHOCYTES # BLD AUTO: 2.31 10*3/MM3 (ref 0.9–4.8)
LYMPHOCYTES NFR BLD AUTO: 16.8 % (ref 19.6–45.3)
MCH RBC QN AUTO: 27.4 PG (ref 26.9–32)
MCHC RBC AUTO-ENTMCNC: 31 G/DL (ref 32.4–36.3)
MCV RBC AUTO: 88.2 FL (ref 80.5–98.2)
MONOCYTES # BLD AUTO: 1.13 10*3/MM3 (ref 0.2–1.2)
MONOCYTES NFR BLD AUTO: 8.2 % (ref 5–12)
NEUTROPHILS # BLD AUTO: 10.17 10*3/MM3 (ref 1.9–8.1)
NEUTROPHILS NFR BLD AUTO: 74.2 % (ref 42.7–76)
NRBC BLD MANUAL-RTO: 0 /100 WBC (ref 0–0)
PLATELET # BLD AUTO: 236 10*3/MM3 (ref 140–500)
PMV BLD AUTO: 9 FL (ref 6–12)
RBC # BLD AUTO: 3.4 10*6/MM3 (ref 3.9–5.2)
WBC NRBC COR # BLD: 13.73 10*3/MM3 (ref 4.5–10.7)

## 2017-01-07 PROCEDURE — 99232 SBSQ HOSP IP/OBS MODERATE 35: CPT | Performed by: OBSTETRICS & GYNECOLOGY

## 2017-01-07 PROCEDURE — 85025 COMPLETE CBC W/AUTO DIFF WBC: CPT | Performed by: OBSTETRICS & GYNECOLOGY

## 2017-01-07 RX ADMIN — IBUPROFEN 600 MG: 600 TABLET ORAL at 11:49

## 2017-01-07 RX ADMIN — Medication: at 11:49

## 2017-01-07 RX ADMIN — IBUPROFEN 600 MG: 600 TABLET ORAL at 21:14

## 2017-01-07 RX ADMIN — IBUPROFEN 600 MG: 600 TABLET ORAL at 04:33

## 2017-01-07 RX ADMIN — ATENOLOL 50 MG: 50 TABLET ORAL at 20:53

## 2017-01-07 RX ADMIN — Medication 1 TABLET: at 11:49

## 2017-01-07 NOTE — PLAN OF CARE
Problem: Patient Care Overview (Adult)  Goal: Plan of Care Review  Outcome: Ongoing (interventions implemented as appropriate)    01/07/17 0634   Coping/Psychosocial Response Interventions   Plan Of Care Reviewed With patient   Patient Care Overview   Progress improving   Outcome Evaluation   Outcome Summary/Follow up Plan Vaginal delivery, empty X1, infant Trisomy 21, pain controlled with pain meds,        Goal: Adult Individualization and Mutuality  Outcome: Ongoing (interventions implemented as appropriate)  Goal: Discharge Needs Assessment  Outcome: Ongoing (interventions implemented as appropriate)    Problem: Postpartum, Vaginal Delivery (Adult)  Goal: Signs and Symptoms of Listed Potential Problems Will be Absent or Manageable (Postpartum, Vaginal Delivery)  Outcome: Ongoing (interventions implemented as appropriate)

## 2017-01-07 NOTE — L&D DELIVERY NOTE
[facility]  Vaginal Delivery Note    Delivery    Predelivery Diagnoses: 1): Pregnancy at 37w1d                                          2) IUGR                                           3) possible chromosomal abnormality                     Postdelivery Diagnoses 1): Pregnancy at 37w1d                                          2) IUGR                                           3) possible chromosomal abnormality    Attending :  Baljit Wiseman MD     Procedure : Obstetrical controlled vaginal delivery    Anesthesia : epidural      Delivery Narrative :     Citlalli Rodriguez is a 26 y.o. year old  @ 37w1d estimated gestational age. She presented to L&D for induction for IUGR.   Her prenatal care was with Dr. 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  Given when we started, just continued with weekly monitoring and moved to deliver once term (per ACOG recommendations).   Once on L&D her labor progressed weel along the labor curve with the aid cytotec x 2, amniotomy, pitocin and epidural interventions.   Once she was to the second stage, I was called for delivery.     Upon arrive she was prepped in the lithotomy position, and great her pushing efforts.  With delivery of the fetal head in OA presentation, bulb suction was performed and using gentle downward traction the anterior shoulder delivered without difficulty.   The infant showed great cry and tone and was placed upon the Mother's abdomen.   I then clamped the cord and it was cut by father of the baby.   Care of the infant was then turned over to the delivery team.   Cord blood was obtained and then using gentle pressure the placenta was delivered spontaneous/intact and noted to have 3 vessel cord.  At that point I undertook inspection of the perineum and vulva and no repair was required.        The area was noted to be hemostatic and all sponge and needle counts were correct. A vaginal exam showed no issues with retained equipment or suture in an abnormal place.      There were two family members noted to be in the room with this family.            Delivery: Vaginal, Spontaneous Delivery     YOB: 2018    Time of Birth: 6:44 PM      Anesthesia: Epidural                       Infant    Findings: female  infant     Infant observations: Weight: 5 lb 2.3 oz (2.333 kg)   Length: 17  in  Observations/Comments:         Apgars: 9   @ 1 minute /    9   @ 5 minutes       Complications  none    Disposition  Mother to Mother Baby/Postpartum  in stable condition currently.  Baby to remains with mom  in stable condition currently.      Baljit Wiseman MD  01/06/17  7:09 PM

## 2017-01-07 NOTE — PLAN OF CARE
Problem: Patient Care Overview (Adult)  Goal: Discharge Needs Assessment    17 0955   Discharge Needs Assessment   Concerns To Be Addressed adjustment to diagnosis/illness concerns;other (see comments)  ( trisomy 21)   Readmission Within The Last 30 Days no previous admission in last 30 days   Equipment Needed After Discharge none   Current Health   Anticipated Changes Related to Illness none   Self-Care   Equipment Currently Used at Home none   Living Environment   Transportation Available car

## 2017-01-07 NOTE — PLAN OF CARE
Problem: Patient Care Overview (Adult)  Goal: Plan of Care Review  Outcome: Ongoing (interventions implemented as appropriate)    01/07/17 0634   Coping/Psychosocial Response Interventions   Plan Of Care Reviewed With patient   Patient Care Overview   Progress improving   Outcome Evaluation   Outcome Summary/Follow up Plan Vaginal delivery, empty X1, infant Trisomy 21, pain controlled with pain meds,          01/07/17 0634 01/07/17 0944   Coping/Psychosocial Response Interventions   Plan Of Care Reviewed With patient --    Patient Care Overview   Progress improving --    Outcome Evaluation   Outcome Summary/Follow up Plan --  Pt. continues to progress, up ad dustin.        Goal: Adult Individualization and Mutuality  Outcome: Ongoing (interventions implemented as appropriate)    01/06/17 0234   Individualization   Patient Specific Preferences breastfeeding   Patient Specific Goals go natural if possible       Goal: Discharge Needs Assessment  Outcome: Ongoing (interventions implemented as appropriate)    01/06/17 0103 01/06/17 0106 01/07/17 0634   Discharge Needs Assessment   Concerns To Be Addressed --  --  no discharge needs identified   Living Environment   Transportation Available car --  --    Self-Care   Equipment Currently Used at Home --  none --          Problem: Postpartum, Vaginal Delivery (Adult)  Goal: Signs and Symptoms of Listed Potential Problems Will be Absent or Manageable (Postpartum, Vaginal Delivery)  Outcome: Ongoing (interventions implemented as appropriate)    01/07/17 0634   Postpartum, Vaginal Delivery   Problems Assessed (Postpartum Vaginal Delivery) all   Problems Present (Postpartum Vaginal Delivery) none

## 2017-01-07 NOTE — PLAN OF CARE
Problem: Patient Care Overview (Adult)  Goal: Plan of Care Review  Outcome: Ongoing (interventions implemented as appropriate)    01/06/17 1934   Coping/Psychosocial Response Interventions   Plan Of Care Reviewed With patient;spouse   Patient Care Overview   Progress improving       Goal: Adult Individualization and Mutuality  Outcome: Ongoing (interventions implemented as appropriate)  Goal: Discharge Needs Assessment  Outcome: Ongoing (interventions implemented as appropriate)    Problem: Labor (Cervical Ripen, Induct, Augment) (Adult,Obstetrics,Pediatric)  Goal: Signs and Symptoms of Listed Potential Problems Will be Absent or Manageable (Labor)  Outcome: Outcome(s) achieved Date Met:  01/06/17

## 2017-01-07 NOTE — LACTATION NOTE
This note was copied from the chart of Samuel Rodriguez.  Mom wanting to pump and feed colostrum and breastmilk out of bottle.  Encouraged some breastfeeding to assist with tongue and facial tone.  Baby noted to have trisomy 21.

## 2017-01-07 NOTE — PROGRESS NOTES
"Subjective:  Postpartum Day 1:     The patient feels well.  Pain is well controlled with current medications. The baby is well.  Urinary output is adequate. The patient is ambulating well. The patient is tolerating a normal diet. Flatus has been passed.      Objective:    Vital signs in last 24 hours:  Blood pressure 105/71, pulse 71, temperature 97.8 °F (36.6 °C), temperature source Oral, resp. rate 18, height 63\" (160 cm), weight 170 lb (77.1 kg), SpO2 100 %, currently breastfeeding.      General:    alert, appears stated age and cooperative   Uterine Fundus:   firm     Labs    Rh negative mother.  Female infant    Assessment/Plan:.     Postpartum Day #1  - Continue postpartum course  - Rhogam if indicated      Roger Laurent MD    "

## 2017-01-08 VITALS
OXYGEN SATURATION: 100 % | RESPIRATION RATE: 20 BRPM | HEART RATE: 69 BPM | TEMPERATURE: 97.7 F | BODY MASS INDEX: 30.12 KG/M2 | SYSTOLIC BLOOD PRESSURE: 106 MMHG | WEIGHT: 170 LBS | HEIGHT: 63 IN | DIASTOLIC BLOOD PRESSURE: 74 MMHG

## 2017-01-08 PROCEDURE — 99238 HOSP IP/OBS DSCHRG MGMT 30/<: CPT | Performed by: OBSTETRICS & GYNECOLOGY

## 2017-01-08 RX ORDER — HYDROCODONE BITARTRATE AND ACETAMINOPHEN 5; 325 MG/1; MG/1
2 TABLET ORAL EVERY 6 HOURS PRN
Qty: 30 TABLET | Refills: 0 | Status: SHIPPED | OUTPATIENT
Start: 2017-01-08 | End: 2017-01-16

## 2017-01-08 NOTE — PLAN OF CARE
Problem: Patient Care Overview (Adult)  Goal: Plan of Care Review  Outcome: Ongoing (interventions implemented as appropriate)    01/08/17 0627   Coping/Psychosocial Response Interventions   Plan Of Care Reviewed With patient   Patient Care Overview   Progress improving       Goal: Adult Individualization and Mutuality  Outcome: Ongoing (interventions implemented as appropriate)  Goal: Discharge Needs Assessment  Outcome: Ongoing (interventions implemented as appropriate)    Problem: Postpartum, Vaginal Delivery (Adult)  Goal: Signs and Symptoms of Listed Potential Problems Will be Absent or Manageable (Postpartum, Vaginal Delivery)  Outcome: Ongoing (interventions implemented as appropriate)

## 2017-01-08 NOTE — DISCHARGE SUMMARY
Date of Discharge:  2017    Discharge Diagnosis: postpartum care immediate      Presenting Problem/History of Present Illness  IUGR (intrauterine growth restriction) affecting care of mother, first trimester, not applicable or unspecified fetus [O36.5910]  IUGR (intrauterine growth restriction) affecting care of mother [O36.5990]       Hospital Course  Patient is a 26 y.o. female  37w1d presented for induction due to IUGR and concern for possible chromosomal abnormality.  Her postpartum course was uneventful and today PPD#2, she is ready for discharge.  She meets all milestones and criteria for discharge and instructions were reviewed and she voiced understanding. Plan is to have her board on unit so they can do chromosomes on  tomorrow.     Procedures Performed         Consults:   Consults     No orders found from 2016 to 2017.          Pertinent Test Results: Chromosomes on infant     Condition on Discharge:  Stable     Discharge Disposition  Home or Self Care    Discharge Medications   Jennifer Citlalli   Home Medication Instructions TOPHER:[id number]    Printed on:17 0857   Medication Information                      atenolol (TENORMIN) 50 MG tablet  Take 50 mg by mouth Daily.             HYDROcodone-acetaminophen (NORCO) 5-325 MG per tablet  Take 2 tablets by mouth Every 6 (Six) Hours As Needed for moderate pain (4-6) for up to 8 days.             PRENATAL 28-0.8 MG tablet  Take  by mouth.             raNITIdine (ZANTAC) 150 MG tablet  Take 150 mg by mouth 2 (Two) Times a Day.                 Discharge Diet:   Diet Instructions     Advance Diet As Tolerated                     Activity at Discharge:   Activity Instructions     Pelvic Rest                     Follow-up Appointments  No future appointments.      Test Results Pending at Discharge       Baljit Wiseman MD  17  8:57 AM

## 2017-01-08 NOTE — PLAN OF CARE
Problem: Patient Care Overview (Adult)  Goal: Plan of Care Review  Outcome: Outcome(s) achieved Date Met:  01/08/17 01/07/17 0944 01/08/17 0627   Coping/Psychosocial Response Interventions   Plan Of Care Reviewed With --  patient   Patient Care Overview   Progress --  improving   Outcome Evaluation   Outcome Summary/Follow up Plan Pt. continues to progress, up ad dustin.  --        Goal: Adult Individualization and Mutuality  Outcome: Outcome(s) achieved Date Met:  01/08/17 01/06/17 0234   Individualization   Patient Specific Preferences breastfeeding   Patient Specific Goals go natural if possible       Goal: Discharge Needs Assessment  Outcome: Outcome(s) achieved Date Met:  01/08/17 01/07/17 0955 01/08/17 0627   Discharge Needs Assessment   Concerns To Be Addressed --  no discharge needs identified   Readmission Within The Last 30 Days no previous admission in last 30 days --    Equipment Needed After Discharge none --    Current Health   Anticipated Changes Related to Illness none --    Self-Care   Equipment Currently Used at Home none --    Living Environment   Transportation Available car --          Problem: Postpartum, Vaginal Delivery (Adult)  Goal: Signs and Symptoms of Listed Potential Problems Will be Absent or Manageable (Postpartum, Vaginal Delivery)  Outcome: Outcome(s) achieved Date Met:  01/08/17 01/08/17 0627   Postpartum, Vaginal Delivery   Problems Assessed (Postpartum Vaginal Delivery) all   Problems Present (Postpartum Vaginal Delivery) none

## 2017-01-08 NOTE — PROGRESS NOTES
Postpartum Progress Note      Status post Vaginal Deliver: Doing well postoperatively.     Discharge home with standard precautions and return to clinic in 4-6 weeks.  Going to border her tonight so they can do chromosomes on baby first thing in morning and discharge leave tomorrow.     Rh status: O negative - Infant O negative, Rhogam not indicated.   Rubella: immune  Gender: Female     Subjective     Postpartum Day 2: Vaginal delivery    The patient feels well. The patient denies emotional concerns. Pain is well controlled with current medications. The baby is well. The patient is ambulating well. The patient is tolerating a normal diet.     Objective     Vital signs in last 24 hours:  Temp:  [97.6 °F (36.4 °C)-97.9 °F (36.6 °C)] 97.8 °F (36.6 °C)  Heart Rate:  [55-73] 56  Resp:  [16-18] 18  BP: (107-115)/(61-76) 110/61      General:    alert, appears stated age and cooperative   Abdomen:  Soft, Non-tender    Lochia:  appropriate   Uterine Fundus:   firm   Ext    Edema 1+   DVT Evaluation:  No evidence of DVT seen on physical exam.     Lab Results   Component Value Date    WBC 13.73 (H) 01/07/2017    HGB 9.3 (L) 01/07/2017    HCT 30.0 (L) 01/07/2017    MCV 88.2 01/07/2017     01/07/2017       Baljit Wiseman MD  1/8/2017  8:54 AM

## 2017-02-16 ENCOUNTER — POSTPARTUM VISIT (OUTPATIENT)
Dept: OBSTETRICS AND GYNECOLOGY | Facility: CLINIC | Age: 27
End: 2017-02-16

## 2017-02-16 VITALS
HEART RATE: 55 BPM | DIASTOLIC BLOOD PRESSURE: 81 MMHG | BODY MASS INDEX: 26.93 KG/M2 | SYSTOLIC BLOOD PRESSURE: 121 MMHG | WEIGHT: 152 LBS | HEIGHT: 63 IN

## 2017-02-16 DIAGNOSIS — Z30.011 OCP (ORAL CONTRACEPTIVE PILLS) INITIATION: ICD-10-CM

## 2017-02-16 DIAGNOSIS — Z12.4 SCREENING FOR CERVICAL CANCER: ICD-10-CM

## 2017-02-16 PROCEDURE — 99213 OFFICE O/P EST LOW 20 MIN: CPT | Performed by: OBSTETRICS & GYNECOLOGY

## 2017-02-16 RX ORDER — NORGESTIMATE AND ETHINYL ESTRADIOL 0.25-0.035
1 KIT ORAL DAILY
Qty: 1 PACKAGE | Refills: 12 | Status: SHIPPED | OUTPATIENT
Start: 2017-02-16 | End: 2018-02-20 | Stop reason: SDUPTHER

## 2017-02-16 NOTE — PROGRESS NOTES
"Batsheva Rodriguez is a 27 y.o. female here for Post Partum exam. Pt is s/p vaginal delivery 2017. Pt is bottle feeding. Pt request OCP's for contraception. Pt denies any baby blues.     History of Present Illness     27 y.o.  S/P    No baby blues  Bottle feeding  To start OCP   Last pap: none on file.         Review of Systems   Constitutional: Negative for appetite change, chills and fever.   Respiratory: Negative for shortness of breath.    Cardiovascular: Negative for chest pain.   Gastrointestinal: Negative for abdominal pain, nausea and vomiting.   Genitourinary: Negative for difficulty urinating, pelvic pain, vaginal bleeding and vaginal discharge.       Objective    Visit Vitals   • /81   • Pulse 55   • Ht 63\" (160 cm)   • Wt 152 lb (68.9 kg)   • Breastfeeding No   • BMI 26.93 kg/m2       Physical Exam   Constitutional: She is oriented to person, place, and time. She appears well-developed and well-nourished.   HENT:   Head: Normocephalic and atraumatic.   Eyes: Conjunctivae are normal.   Neck: Normal range of motion. Neck supple. No thyromegaly present.   Cardiovascular: Normal rate and regular rhythm.    No murmur heard.  Pulmonary/Chest: Effort normal and breath sounds normal. Right breast exhibits no inverted nipple, no mass and no nipple discharge. Left breast exhibits no inverted nipple, no mass and no nipple discharge.   Abdominal: Soft. Bowel sounds are normal. She exhibits no distension. There is no tenderness.   Genitourinary: Vagina normal and uterus normal. Pelvic exam was performed with patient prone. There is no lesion on the right labia. There is no lesion on the left labia. Uterus is not enlarged (anteverted ), not fixed and not tender. Cervix exhibits no motion tenderness. Right adnexum displays no mass and no tenderness. Left adnexum displays no mass and no tenderness. No bleeding in the vagina. No vaginal discharge found.   Musculoskeletal: She exhibits no edema. "   Lymphadenopathy:        Right: No inguinal adenopathy present.        Left: No inguinal adenopathy present.   Neurological: She is alert and oriented to person, place, and time.   Skin: No rash noted.   Psychiatric: She has a normal mood and affect. Her behavior is normal.         Assessment/Plan   Citlalli was seen today for postpartum care.    Diagnoses and all orders for this visit:    Postpartum state    OCP (oral contraceptive pills) initiation    Screening for cervical cancer  -     Pap IG, Rfx HPV ASCU    Other orders  -     norgestimate-ethinyl estradiol (MONONESSA) 0.25-35 MG-MCG per tablet; Take 1 tablet by mouth Daily.        1) PP - Released from restrictions  2) OCP how to start  Common/life threatening issues  Efficiency   3) pap reviewed    Baljit Wiseman MD  2/16/2017  1:33 PM

## 2017-02-21 LAB
CONV .: NORMAL
CYTOLOGIST CVX/VAG CYTO: NORMAL
CYTOLOGY CVX/VAG DOC THIN PREP: NORMAL
DX ICD CODE: NORMAL
HIV 1 & 2 AB SER-IMP: NORMAL
Lab: NORMAL
OTHER STN SPEC: NORMAL
PATH REPORT.FINAL DX SPEC: NORMAL
STAT OF ADQ CVX/VAG CYTO-IMP: NORMAL

## 2018-02-20 RX ORDER — NORGESTIMATE AND ETHINYL ESTRADIOL 0.25-0.035
KIT ORAL
Qty: 28 TABLET | Refills: 0 | Status: SHIPPED | OUTPATIENT
Start: 2018-02-20 | End: 2018-03-17 | Stop reason: SDUPTHER

## 2018-03-19 RX ORDER — NORGESTIMATE AND ETHINYL ESTRADIOL 0.25-0.035
KIT ORAL
Qty: 28 TABLET | Refills: 0 | Status: SHIPPED | OUTPATIENT
Start: 2018-03-19 | End: 2018-04-17 | Stop reason: SDUPTHER

## 2018-03-19 NOTE — TELEPHONE ENCOUNTER
Appears patient last seen in Feb 2017.  Should schedule annual to continue oral contraceptive pill.  One Rx sent.

## 2018-04-17 ENCOUNTER — OFFICE VISIT (OUTPATIENT)
Dept: OBSTETRICS AND GYNECOLOGY | Facility: CLINIC | Age: 28
End: 2018-04-17

## 2018-04-17 VITALS
HEART RATE: 54 BPM | WEIGHT: 137 LBS | SYSTOLIC BLOOD PRESSURE: 116 MMHG | HEIGHT: 63 IN | BODY MASS INDEX: 24.27 KG/M2 | DIASTOLIC BLOOD PRESSURE: 77 MMHG

## 2018-04-17 DIAGNOSIS — Z30.41 ENCOUNTER FOR SURVEILLANCE OF CONTRACEPTIVE PILLS: ICD-10-CM

## 2018-04-17 DIAGNOSIS — Z01.419 ENCOUNTER FOR GYNECOLOGICAL EXAMINATION WITHOUT ABNORMAL FINDING: Primary | ICD-10-CM

## 2018-04-17 PROCEDURE — 99395 PREV VISIT EST AGE 18-39: CPT | Performed by: OBSTETRICS & GYNECOLOGY

## 2018-04-17 RX ORDER — NORGESTIMATE AND ETHINYL ESTRADIOL 0.25-0.035
KIT ORAL
Qty: 28 TABLET | Refills: 0 | Status: CANCELLED | OUTPATIENT
Start: 2018-04-17

## 2018-04-17 RX ORDER — NORGESTIMATE AND ETHINYL ESTRADIOL 0.25-0.035
1 KIT ORAL DAILY
Qty: 28 TABLET | Refills: 12 | Status: SHIPPED | OUTPATIENT
Start: 2018-04-17 | End: 2019-04-18 | Stop reason: SDUPTHER

## 2018-04-17 NOTE — PROGRESS NOTES
GYN Annual Exam     CC- Here for annual exam.     Citlalli Rodriguez is a 28 y.o. female who presents for annual well woman exam. Periods are regular every 28-30 days, lasting 3 days. Dysmenorrhea:mild, occurring premenstrually and first 1-2 days of flow. Cyclic symptoms include none. No intermenstrual bleeding, spotting, or discharge.    OB History      Para Term  AB Living    3 2 2  1 2    SAB TAB Ectopic Molar Multiple Live Births    1    0 2          Current contraception: OCP (estrogen/progesterone)  History of abnormal Pap smear: yes - Cryo in the past.  Family history of uterine, colon or ovarian cancer: no  History of abnormal mammogram: no  Family history of breast cancer: no  Last Pap : 2017 NL HPV-    Past Medical History:   Diagnosis Date   • Abnormal Pap smear of cervix    • Chlamydia    • HPV (human papilloma virus) infection    • SVT (supraventricular tachycardia)    • Varicella        Past Surgical History:   Procedure Laterality Date   • GYNECOLOGIC CRYOSURGERY     • WISDOM TOOTH EXTRACTION           Current Outpatient Prescriptions:   •  atenolol (TENORMIN) 50 MG tablet, Take 50 mg by mouth Daily., Disp: , Rfl:   •  MONONESSA 0.25-35 MG-MCG per tablet, TAKE 1 TABLET BY MOUTH EVERY DAY, Disp: 28 tablet, Rfl: 0    Allergies   Allergen Reactions   • Penicillins Itching and Rash   • Phenergan [Promethazine] Itching and Rash   • Promethazine Hcl Nausea And Vomiting and Rash       Social History   Substance Use Topics   • Smoking status: Never Smoker   • Smokeless tobacco: Never Used   • Alcohol use No       Family History   Problem Relation Age of Onset   • No Known Problems Father    • No Known Problems Mother    • Breast cancer Neg Hx    • Ovarian cancer Neg Hx    • Uterine cancer Neg Hx    • Colon cancer Neg Hx    • Deep vein thrombosis Neg Hx    • Pulmonary embolism Neg Hx        Review of Systems   Constitutional: Negative for appetite change, chills and fever.   Respiratory: Negative  "for shortness of breath.    Cardiovascular: Negative for chest pain.   Gastrointestinal: Negative for abdominal pain, nausea and vomiting.   Genitourinary: Negative for difficulty urinating, pelvic pain, vaginal bleeding and vaginal discharge.       /77   Pulse 54   Ht 160 cm (63\")   Wt 62.1 kg (137 lb)   LMP 03/21/2018 (Exact Date)   Breastfeeding? No   BMI 24.27 kg/m²     Physical Exam   Constitutional: She is oriented to person, place, and time. She appears well-developed and well-nourished. No distress.   HENT:   Head: Normocephalic and atraumatic.   Eyes: Conjunctivae are normal.   Neck: Normal range of motion. Neck supple. No thyromegaly present.   Cardiovascular: Normal rate and regular rhythm.    No murmur heard.  Pulmonary/Chest: Effort normal and breath sounds normal. Right breast exhibits no inverted nipple, no mass and no nipple discharge. Left breast exhibits no inverted nipple, no mass and no nipple discharge.   Abdominal: Soft. Bowel sounds are normal. She exhibits no distension. There is no tenderness.   Genitourinary: Vagina normal and uterus normal. Pelvic exam was performed with patient supine. There is no lesion on the right labia. There is no lesion on the left labia. Uterus is not enlarged (anteverted ), not fixed and not tender. Cervix exhibits no motion tenderness. Right adnexum displays no mass and no tenderness. Left adnexum displays no mass and no tenderness. No bleeding in the vagina. No vaginal discharge found.   Musculoskeletal: She exhibits no edema.   Lymphadenopathy:        Right: No inguinal adenopathy present.        Left: No inguinal adenopathy present.   Neurological: She is alert and oriented to person, place, and time.   Skin: No rash noted.   Psychiatric: She has a normal mood and affect. Her behavior is normal.          Assessment     1) GYN annual well woman exam.   2) OCP to continue      Plan     1) Breast Health - Clinical breast exam yearly, Self breast " awareness monthly  2) Pap - up to date   3) Smoking status- non-smoker   4) Seat belts & Sunscreen recommended  5) Follow up prn and one year.     Baljit Wiseman MD   4/17/2018  11:50 AM

## 2018-04-23 ENCOUNTER — TELEPHONE (OUTPATIENT)
Dept: OBSTETRICS AND GYNECOLOGY | Facility: CLINIC | Age: 28
End: 2018-04-23

## 2018-04-23 NOTE — TELEPHONE ENCOUNTER
Called pt, she was given another OCP besides Mononessa at the pharmacy. Pt is to call the pharmacy and see if there is manufacturing issue and let us know.    Sharyn

## 2018-08-31 ENCOUNTER — OFFICE VISIT CONVERTED (OUTPATIENT)
Dept: FAMILY MEDICINE CLINIC | Age: 28
End: 2018-08-31
Attending: NURSE PRACTITIONER

## 2018-10-02 ENCOUNTER — OFFICE VISIT CONVERTED (OUTPATIENT)
Dept: FAMILY MEDICINE CLINIC | Age: 28
End: 2018-10-02
Attending: NURSE PRACTITIONER

## 2019-03-27 ENCOUNTER — OFFICE VISIT CONVERTED (OUTPATIENT)
Dept: FAMILY MEDICINE CLINIC | Age: 29
End: 2019-03-27
Attending: NURSE PRACTITIONER

## 2019-04-18 RX ORDER — NORGESTIMATE AND ETHINYL ESTRADIOL 0.25-0.035
KIT ORAL
Qty: 28 TABLET | Refills: 0 | Status: SHIPPED | OUTPATIENT
Start: 2019-04-18 | End: 2019-05-17 | Stop reason: SDUPTHER

## 2019-05-17 RX ORDER — NORGESTIMATE AND ETHINYL ESTRADIOL 0.25-0.035
KIT ORAL
Qty: 28 TABLET | Refills: 0 | Status: SHIPPED | OUTPATIENT
Start: 2019-05-17 | End: 2019-06-17 | Stop reason: SDUPTHER

## 2019-06-17 ENCOUNTER — TELEPHONE (OUTPATIENT)
Dept: OBSTETRICS AND GYNECOLOGY | Facility: CLINIC | Age: 29
End: 2019-06-17

## 2019-06-17 RX ORDER — NORGESTIMATE AND ETHINYL ESTRADIOL 0.25-0.035
1 KIT ORAL DAILY
Qty: 28 TABLET | Refills: 0 | Status: SHIPPED | OUTPATIENT
Start: 2019-06-17 | End: 2019-07-13 | Stop reason: SDUPTHER

## 2019-06-17 NOTE — TELEPHONE ENCOUNTER
Patient called she is out of her birthcontrol. She said that her pharmacy sent in a refill request last Friday which I do see in her chart she would like to know if this can be filled she is scheduled for her annual next month.

## 2019-06-19 RX ORDER — NORGESTIMATE AND ETHINYL ESTRADIOL 0.25-0.035
KIT ORAL
Qty: 28 TABLET | Refills: 0 | Status: SHIPPED | OUTPATIENT
Start: 2019-06-19 | End: 2019-07-26

## 2019-07-15 RX ORDER — NORGESTIMATE AND ETHINYL ESTRADIOL 0.25-0.035
KIT ORAL
Qty: 28 TABLET | Refills: 0 | Status: SHIPPED | OUTPATIENT
Start: 2019-07-15 | End: 2019-07-26 | Stop reason: SDUPTHER

## 2019-07-26 ENCOUNTER — OFFICE VISIT (OUTPATIENT)
Dept: OBSTETRICS AND GYNECOLOGY | Facility: CLINIC | Age: 29
End: 2019-07-26

## 2019-07-26 VITALS
HEIGHT: 63 IN | WEIGHT: 149 LBS | BODY MASS INDEX: 26.4 KG/M2 | HEART RATE: 60 BPM | DIASTOLIC BLOOD PRESSURE: 68 MMHG | SYSTOLIC BLOOD PRESSURE: 115 MMHG

## 2019-07-26 DIAGNOSIS — Z30.41 ENCOUNTER FOR SURVEILLANCE OF CONTRACEPTIVE PILLS: ICD-10-CM

## 2019-07-26 DIAGNOSIS — Z01.419 ENCOUNTER FOR GYNECOLOGICAL EXAMINATION WITHOUT ABNORMAL FINDING: Primary | ICD-10-CM

## 2019-07-26 PROCEDURE — 99395 PREV VISIT EST AGE 18-39: CPT | Performed by: OBSTETRICS & GYNECOLOGY

## 2019-07-26 RX ORDER — NORGESTIMATE AND ETHINYL ESTRADIOL 0.25-0.035
1 KIT ORAL DAILY
Qty: 28 TABLET | Refills: 12 | Status: SHIPPED | OUTPATIENT
Start: 2019-07-26 | End: 2019-08-12 | Stop reason: SDUPTHER

## 2019-07-26 RX ORDER — ATENOLOL 50 MG/1
50 TABLET ORAL DAILY
COMMUNITY
Start: 2019-03-25 | End: 2020-08-21

## 2019-07-26 RX ORDER — AMITRIPTYLINE HYDROCHLORIDE 10 MG/1
TABLET, FILM COATED ORAL
Refills: 0 | COMMUNITY
Start: 2019-06-22 | End: 2020-08-21

## 2019-07-26 NOTE — PROGRESS NOTES
GYN Annual Exam     CC- Here for annual exam.     Citlalli Rodriguez is a 29 y.o. female who presents for annual well woman exam. Periods are regular every 28-30 days, lasting 4 days. Dysmenorrhea:moderate, occurring premenstrually and first 1-2 days of flow. Cyclic symptoms include none. No intermenstrual bleeding, spotting, or discharge.    OB History      Para Term  AB Living    3 2 2   1 2    SAB TAB Ectopic Molar Multiple Live Births    1       0 2          Current contraception: OCP (estrogen/progesterone)  History of abnormal Pap smear: yes - Cryo in the past  Family history of uterine, colon or ovarian cancer: no  History of abnormal mammogram: no  Family history of breast cancer: no  Last Pap : 2017 NL HPV neg     Past Medical History:   Diagnosis Date   • Abnormal Pap smear of cervix    • Chlamydia    • HPV (human papilloma virus) infection    • SVT (supraventricular tachycardia) (CMS/HCC)    • Varicella        Past Surgical History:   Procedure Laterality Date   • GYNECOLOGIC CRYOSURGERY     • WISDOM TOOTH EXTRACTION           Current Outpatient Medications:   •  atenolol (TENORMIN) 50 MG tablet, Take 50 mg by mouth Daily., Disp: , Rfl:   •  amitriptyline (ELAVIL) 10 MG tablet, TK 1 T PO HS, Disp: , Rfl: 0  •  ESTARYLLA 0.25-35 MG-MCG per tablet, TAKE 1 TABLET BY MOUTH DAILY., Disp: 28 tablet, Rfl: 0    Allergies   Allergen Reactions   • Penicillins Itching and Rash     Rash   • Promethazine Itching and Rash     Rash,restlessness   • Promethazine Hcl Nausea And Vomiting and Rash       Social History     Tobacco Use   • Smoking status: Never Smoker   • Smokeless tobacco: Never Used   Substance Use Topics   • Alcohol use: No   • Drug use: No       Family History   Problem Relation Age of Onset   • No Known Problems Father    • No Known Problems Mother    • Breast cancer Neg Hx    • Ovarian cancer Neg Hx    • Uterine cancer Neg Hx    • Colon cancer Neg Hx    • Deep vein thrombosis Neg Hx    •  "Pulmonary embolism Neg Hx        Review of Systems   Constitutional: Negative for chills and fever.   Gastrointestinal: Negative for abdominal pain.   Genitourinary: Negative for dysuria, menstrual problem, pelvic pain, vaginal bleeding and vaginal discharge.   All other systems reviewed and are negative.      /68   Pulse 60   Ht 160 cm (63\")   Wt 67.6 kg (149 lb)   LMP 07/04/2019   BMI 26.39 kg/m²     Physical Exam   Constitutional: She is oriented to person, place, and time. She appears well-developed and well-nourished. No distress. She is not obese.  HENT:   Head: Normocephalic and atraumatic.   Eyes: Conjunctivae are normal. Right eye exhibits no discharge. Left eye exhibits no discharge.   Neck: Normal range of motion. Neck supple. No thyromegaly present.   Cardiovascular: Normal rate, regular rhythm and normal heart sounds.   No murmur heard.  Pulmonary/Chest: Effort normal and breath sounds normal. No respiratory distress. Right breast exhibits no inverted nipple, no mass and no nipple discharge. Left breast exhibits no inverted nipple, no mass and no nipple discharge.   Abdominal: Soft. Bowel sounds are normal. She exhibits no distension. There is no tenderness.   Genitourinary: Vagina normal and cervix normal. Pelvic exam was performed with patient supine. There is no lesion or Bartholin's cyst on the right labia. There is no lesion or Bartholin's cyst on the left labia. Uterus is anteverted. Uterus is not deviated, enlarged, fixed or exhibiting a mass.   Cervix is not parous. Cervix does not exhibit motion tenderness. Right adnexum displays no mass, no tenderness and no fullness. Left adnexum displays no mass, no tenderness and no fullness. No bleeding in the vagina. No vaginal discharge found.   Musculoskeletal: Normal range of motion. She exhibits no edema.   Lymphadenopathy:     She has no cervical adenopathy.        Right: No inguinal adenopathy present.        Left: No inguinal adenopathy " present.   Neurological: She is alert and oriented to person, place, and time.   Skin: Skin is warm and dry. No rash noted.   Psychiatric: She has a normal mood and affect. Her behavior is normal. Judgment and thought content normal.            Assessment     1) GYN annual well woman exam.   2) OCP refill.      Plan     1) Breast Health - Clinical breast exam yearly, Self breast awareness monthly  2) Pap - up to date   3) Smoking status- non-smoker   4) Activity recommends - Adult 150-300 min/week of multi-component physical activities that include balance training, aerobic and physical strengthening.  Disabled or ill adults should still try to fulfill these requirements, with modifications based on their conditions.  5) Follow up prn and one year.       Baljit Wiseman MD   7/26/2019  11:22 AM

## 2019-08-12 ENCOUNTER — TELEPHONE (OUTPATIENT)
Dept: OBSTETRICS AND GYNECOLOGY | Facility: CLINIC | Age: 29
End: 2019-08-12

## 2019-08-12 RX ORDER — NORGESTIMATE AND ETHINYL ESTRADIOL 0.25-0.035
1 KIT ORAL DAILY
Qty: 28 TABLET | Refills: 12 | Status: SHIPPED | OUTPATIENT
Start: 2019-08-12 | End: 2019-09-24 | Stop reason: SDUPTHER

## 2019-08-12 NOTE — TELEPHONE ENCOUNTER
Sherley,     Sent e-Rx for birth control pill to pharmacy.   Please let her know.     Thanks   Dr. Wiseman    Patient called in regards to her prescription refill request from pharmacy to have her birth control filled. Patient would like a call back once sent.

## 2019-08-14 RX ORDER — NORGESTIMATE AND ETHINYL ESTRADIOL 0.25-0.035
KIT ORAL
Qty: 28 TABLET | Refills: 0 | OUTPATIENT
Start: 2019-08-14

## 2019-09-24 ENCOUNTER — TELEPHONE (OUTPATIENT)
Dept: OBSTETRICS AND GYNECOLOGY | Facility: CLINIC | Age: 29
End: 2019-09-24

## 2019-09-24 RX ORDER — NORGESTIMATE AND ETHINYL ESTRADIOL 0.25-0.035
1 KIT ORAL DAILY
Qty: 84 TABLET | Refills: 3 | Status: SHIPPED | OUTPATIENT
Start: 2019-09-24 | End: 2020-08-21

## 2019-09-24 NOTE — TELEPHONE ENCOUNTER
Rita    Assuming you mean of the birth control?  Sent in as 3 month supply. Please let her know.     Thanks   Dr. Wsieman      Pt wanting to switch from a 30 day supply to a 90 day supply.

## 2019-12-31 ENCOUNTER — OFFICE VISIT CONVERTED (OUTPATIENT)
Dept: FAMILY MEDICINE CLINIC | Age: 29
End: 2019-12-31
Attending: NURSE PRACTITIONER

## 2020-02-04 ENCOUNTER — OFFICE VISIT CONVERTED (OUTPATIENT)
Dept: FAMILY MEDICINE CLINIC | Age: 30
End: 2020-02-04
Attending: NURSE PRACTITIONER

## 2020-03-31 ENCOUNTER — OFFICE VISIT CONVERTED (OUTPATIENT)
Dept: FAMILY MEDICINE CLINIC | Age: 30
End: 2020-03-31
Attending: NURSE PRACTITIONER

## 2020-08-21 ENCOUNTER — OFFICE VISIT (OUTPATIENT)
Dept: OBSTETRICS AND GYNECOLOGY | Facility: CLINIC | Age: 30
End: 2020-08-21

## 2020-08-21 VITALS
HEART RATE: 60 BPM | HEIGHT: 63 IN | DIASTOLIC BLOOD PRESSURE: 76 MMHG | BODY MASS INDEX: 27.46 KG/M2 | WEIGHT: 155 LBS | SYSTOLIC BLOOD PRESSURE: 109 MMHG

## 2020-08-21 DIAGNOSIS — Z30.41 ENCOUNTER FOR SURVEILLANCE OF CONTRACEPTIVE PILLS: ICD-10-CM

## 2020-08-21 DIAGNOSIS — Z30.2 ENCOUNTER FOR STERILIZATION: ICD-10-CM

## 2020-08-21 DIAGNOSIS — Z01.419 ENCOUNTER FOR GYNECOLOGICAL EXAMINATION WITHOUT ABNORMAL FINDING: Primary | ICD-10-CM

## 2020-08-21 PROCEDURE — 99395 PREV VISIT EST AGE 18-39: CPT | Performed by: OBSTETRICS & GYNECOLOGY

## 2020-08-21 RX ORDER — AMITRIPTYLINE HYDROCHLORIDE 10 MG/1
TABLET, FILM COATED ORAL
COMMUNITY
Start: 2020-06-27 | End: 2021-09-23 | Stop reason: SDUPTHER

## 2020-08-21 RX ORDER — ATENOLOL 50 MG/1
50 TABLET ORAL DAILY
COMMUNITY
Start: 2020-07-13 | End: 2020-10-11

## 2020-08-21 RX ORDER — NORGESTIMATE AND ETHINYL ESTRADIOL 0.25-0.035
1 KIT ORAL DAILY
Qty: 28 TABLET | Refills: 12 | Status: SHIPPED | OUTPATIENT
Start: 2020-08-21 | End: 2020-10-01 | Stop reason: SDUPTHER

## 2020-08-21 RX ORDER — NORGESTIMATE AND ETHINYL ESTRADIOL 0.25-0.035
KIT ORAL
COMMUNITY
Start: 2020-07-08 | End: 2020-08-21 | Stop reason: SDUPTHER

## 2020-08-21 NOTE — PROGRESS NOTES
GYN Annual Exam     CC- Here for annual exam.     Citlalli Rodriguez is a 30 y.o. female who presents for annual well woman exam. Periods are regular every 28-30 days, lasting 4 days. Dysmenorrhea:none. Cyclic symptoms include none. No intermenstrual bleeding, spotting, or discharge.  Pt is interested in a TL.     OB History        3    Para   2    Term   2            AB   1    Living   2       SAB   1    TAB        Ectopic        Molar        Multiple   0    Live Births   2                Current contraception: tubal ligation  History of abnormal Pap smear: yes - Cryo in the past  Family history of uterine, colon or ovarian cancer: no  History of abnormal mammogram: no  Family history of breast cancer: no  Last Pap : 2017 NL HPV neg      Past Medical History:   Diagnosis Date   • Abnormal Pap smear of cervix    • Chlamydia    • HPV (human papilloma virus) infection    • SVT (supraventricular tachycardia) (CMS/HCC)    • Varicella        Past Surgical History:   Procedure Laterality Date   • GYNECOLOGIC CRYOSURGERY     • WISDOM TOOTH EXTRACTION           Current Outpatient Medications:   •  amitriptyline (ELAVIL) 10 MG tablet, TK 1 T PO HS, Disp: , Rfl:   •  atenolol (TENORMIN) 50 MG tablet, Take 50 mg by mouth Daily., Disp: , Rfl:   •  norgestimate-ethinyl estradiol (ORTHO-CYCLEN) 0.25-35 MG-MCG per tablet, , Disp: , Rfl:     Allergies   Allergen Reactions   • Penicillins Itching and Rash     Rash   • Promethazine Itching and Rash     Rash,restlessness   • Promethazine Hcl Nausea And Vomiting and Rash       Social History     Tobacco Use   • Smoking status: Never Smoker   • Smokeless tobacco: Never Used   Substance Use Topics   • Alcohol use: No   • Drug use: No       Family History   Problem Relation Age of Onset   • No Known Problems Father    • No Known Problems Mother    • Breast cancer Neg Hx    • Ovarian cancer Neg Hx    • Uterine cancer Neg Hx    • Colon cancer Neg Hx    • Deep vein thrombosis Neg  "Hx    • Pulmonary embolism Neg Hx        Review of Systems   Constitutional: Negative for fever.   Gastrointestinal: Negative for abdominal pain.   Genitourinary: Negative for dysuria, pelvic pain and vaginal bleeding.   All other systems reviewed and are negative.      /76   Pulse 60   Ht 160 cm (63\")   Wt 70.3 kg (155 lb)   LMP 08/04/2020 (Exact Date)   BMI 27.46 kg/m²     Physical Exam   Constitutional: She is oriented to person, place, and time. She appears well-developed and well-nourished. No distress. She is not obese.  HENT:   Head: Normocephalic and atraumatic.   Eyes: Conjunctivae are normal. Right eye exhibits no discharge. Left eye exhibits no discharge.   Neck: Normal range of motion. Neck supple. No thyromegaly present.   Cardiovascular: Normal rate, regular rhythm and normal heart sounds.   No murmur heard.  Pulmonary/Chest: Effort normal and breath sounds normal. No respiratory distress. Right breast exhibits no inverted nipple, no mass and no nipple discharge. Left breast exhibits no inverted nipple, no mass and no nipple discharge.   Abdominal: Soft. Bowel sounds are normal. She exhibits no distension. There is no tenderness.   Genitourinary: Vagina normal and cervix normal. Pelvic exam was performed with patient supine. There is no lesion or Bartholin's cyst on the right labia. There is no lesion or Bartholin's cyst on the left labia. Uterus is anteverted. Uterus is not deviated, enlarged, fixed or exhibiting a mass. Cervix does not exhibit motion tenderness or friability. Right adnexum displays no mass, no tenderness and no fullness. Left adnexum displays no mass, no tenderness and no fullness. No bleeding in the vagina. No vaginal discharge found.   Musculoskeletal: Normal range of motion. She exhibits no edema.   Lymphadenopathy:     She has no cervical adenopathy.        Right: No inguinal adenopathy present.        Left: No inguinal adenopathy present.   Neurological: She is alert " and oriented to person, place, and time.   Skin: Skin is warm and dry. No rash noted.   Psychiatric: She has a normal mood and affect. Her behavior is normal. Judgment and thought content normal.            Assessment     1) GYN annual well woman exam.   2) contraceptive discussion   Reviewed OCPs   Discussed sterilization vs vasectomy   Going to discuss with  and decide.   Various options discussed including natural family planning and abstinence. Pt still wishes for permanent sterilization.The risks, benefits, and alternatives were discussed including surgery risks such as failure of sterilization method with future pregnancy, ectopic pregnancy, bleeding, infection, scar, pain, wound breakdown, conversion to open approach, GI/ injury, DVT, nerve damage, organ failure, anesthesia complications, and death. Discussed preop and typical recovery. Discussed risk of future pregnancy and regret. All questions answered.     Okay to call back and schedule for Friday      Plan     1) Breast Health - Clinical breast exam yearly, Self breast awareness monthly  2) Pap - up to date   3) Smoking status- non-smoker   4) Activity recommends - Adult 150-300 min/week of multi-component physical activities that include balance training, aerobic and physical strengthening.    Avoidance of distracted driving issues (texts, phone calls).   5) Follow up prn and one year.       Baljit Wiseman MD   8/21/2020  10:59

## 2020-10-01 ENCOUNTER — TELEPHONE (OUTPATIENT)
Dept: OBSTETRICS AND GYNECOLOGY | Facility: CLINIC | Age: 30
End: 2020-10-01

## 2020-10-01 RX ORDER — NORGESTIMATE AND ETHINYL ESTRADIOL 0.25-0.035
1 KIT ORAL DAILY
Qty: 3 PACKAGE | Refills: 3 | Status: SHIPPED | OUTPATIENT
Start: 2020-10-01 | End: 2021-07-06

## 2020-10-01 NOTE — TELEPHONE ENCOUNTER
Adeline,     Rx for OCP recent to [facility] as requested.   Please let her know.     Thanks,   Dr. Wiseman

## 2021-03-24 ENCOUNTER — OFFICE VISIT CONVERTED (OUTPATIENT)
Dept: FAMILY MEDICINE CLINIC | Age: 31
End: 2021-03-24
Attending: NURSE PRACTITIONER

## 2021-05-18 NOTE — PROGRESS NOTES
Citlalli Rodriguez  1990     Office/Outpatient Visit    Visit Date: Wed, Mar 24, 2021 04:31 pm    Provider: Erin Galvan N.P. (Assistant: Michelle Thompson MA)    Location: [facility]        Electronically signed by Erin Galvan N.P. on  03/24/2021 05:24:43 PM                             Subjective:        CC: Mrs. Rodriguez is a 31 year old White female.  This is a follow-up visit.  med refill;         HPI: lmp last week          Mrs. Rodriguez presents with supraventricular tachycardia.  stable on atenolol.  sees cardiology annually.  that office refills atenolol.            In regard to the headache, hormones are main trigger.   headaches occur less often with use of amitriptyline.    takes ibuprofen 24 hrs prior to starting menses.    denies side effects.  requests refills.            Additionally, she presents with history of anxiety disorder, unspecified.  buspirone 5 mg bid prn was not effective.  taking 10 mg dose caused drowsiness.  would like to try different medication for anxiety and no controlled substance.      ROS:     CONSTITUTIONAL:  Negative for chills, fatigue, fever, and weight change.      CARDIOVASCULAR:  Negative for chest pain, palpitations, tachycardia, orthopnea, and edema.      RESPIRATORY:  Negative for cough, dyspnea, and hemoptysis.      MUSCULOSKELETAL:  Negative for arthralgias, back pain, and myalgias.      NEUROLOGICAL:  Positive for headaches.   Negative for dizziness or paresthesias.      PSYCHIATRIC:  Positive for anxiety, feelings of stress and insomnia.   Negative for depression or suicidal thoughts.          Past Medical History / Family History / Social History:         Last Reviewed on 3/24/2021 05:18 PM by Erin Galvan    Past Medical History:                 PAST MEDICAL HISTORY         svt age 8 sees dr hagan once per year     Migraine Headaches: headache frequency averages 2-3 per week;         CURRENT MEDICAL PROVIDERS:    Cardiologist     Obstetrician/Gynecologist         PREVENTIVE HEALTH MAINTENANCE             PAP SMEAR: was last done feb 2019 with normal results abnormal age 16         Surgical History:     NONE         Family History:         Positive for Systemic Lupus Erythematosus ( sister ).      Positive for Anxiety ( sister ).          Social History:         Marital Status:      Children: 2 children         Tobacco/Alcohol/Supplements:     Last Reviewed on 3/24/2021 04:32 PM by Michelle Thompson    Tobacco: She has never smoked.  Non-drinker     Caffeine:  She admits to consuming caffeine via coffee ( 1 serving per day ).          Current Problems:     Last Reviewed on 3/31/2020 02:42 PM by Erin Galvan    Supraventricular tachycardia    Headache    Anxiety disorder, unspecified        Immunizations:     influenza, injectable, quadrivalent, preservative free (FLUZONE QUAD 1494-0699) 12/31/2019        Allergies:     Last Reviewed on 3/24/2021 04:32 PM by Michelle Thompson    Penicillins:      Promethazine HCl:      Zomig: Nausea  (Adverse Reaction)        Current Medications:     Last Reviewed on 3/24/2021 04:32 PM by Michelle Thompson    Atenolol 50 mg oral tablet [1 tab daily ]    Mononessa (28) 0.25-35 mg-mcg oral tablet [Take 1 tablet(s) by mouth daily as directed.]    amitriptyline 10 mg oral tablet [TAKE 1 TABLET BY MOUTH AT BEDTIME]    busPIRone 5 mg oral tablet [take1-2 tablets twice daily prn anxiety]        Objective:        Vitals:         Current: 3/24/2021 4:33:35 PM    Ht:  5 ft, 3.5 in;  Wt: 151.4 lbs;  BMI: 26.4T: 97.9 F (temporal);  BP: 111/64 mm Hg (right arm, sitting);  P: 65 bpm (right arm (BP Cuff), sitting);  sCr: 0.69 mg/dL;  GFR: 115.16        Exams:     PHYSICAL EXAM:     GENERAL:  well developed and nourished; appropriately groomed; in no apparent distress;     NECK: thyroid is non-palpable;     RESPIRATORY: normal respiratory rate and pattern with no distress; normal breath sounds with no rales, rhonchi, wheezes  or rubs;     CARDIOVASCULAR: normal rate; rhythm is regular;     MUSCULOSKELETAL:  Normal range of motion, strength and tone;     NEUROLOGIC: mental status: alert and oriented x 3; GROSSLY INTACT     PSYCHIATRIC:  appropriate affect and demeanor; normal speech pattern; grossly normal memory;         Assessment:         I47.1   Supraventricular tachycardia       R51   Headache       F41.9   Anxiety disorder, unspecified           ORDERS:         Meds Prescribed:       [Refilled] amitriptyline 10 mg oral tablet [TAKE 1 TABLET BY MOUTH AT BEDTIME], #90 (ninety) tablets, Refills: 1 (one)       [New Rx] sertraline 50 mg oral tablet [take 1/2 tablet q hs x 1 week then increase to 1 tablet daily], #30 (thirty) tablets, Refills: 5 (five)                 Plan:         Supraventricular tachycardia        continue per cardiology.  MIPS Vaccines Flu and Pneumonia updated in Shot record         Headache        RECOMMENDATIONS given include: increase oral fluid intake, maintain normal sleep patterns, and continue current treatment.  she is doing well..            Prescriptions:       [Refilled] amitriptyline 10 mg oral tablet [TAKE 1 TABLET BY MOUTH AT BEDTIME], #90 (ninety) tablets, Refills: 1 (one)         Anxiety disorder, unspecified        RECOMMENDATIONS given include: avoidance of caffeine, stress reduction, and start sertraline as below.  follow up in 4-6 wks or sooner if concerns..            Prescriptions:       [New Rx] sertraline 50 mg oral tablet [take 1/2 tablet q hs x 1 week then increase to 1 tablet daily], #30 (thirty) tablets, Refills: 5 (five)             Patient Recommendations:        For  Headache:    Drink more liquids. Maintain normal sleep patterns, avoid lack of sleep, fatigue or oversleep.          For  Anxiety disorder, unspecified:    Try to avoid or reduce the amount of caffeine intake. Try stress reduction methods to reduce the frequency or lessen the severity of anxiety episodes.               Charge Capture:         Primary Diagnosis:     I47.1  Supraventricular tachycardia           Orders:      81509  Office/outpatient visit; established patient, level 3  (In-House)              R51  Headache     F41.9  Anxiety disorder, unspecified         ADDENDUMS:      ____________________________________    Addendum: 03/25/2021 09:12 PM - Erin Galvan        add [id number]    remove [id number]. lj

## 2021-05-18 NOTE — PROGRESS NOTES
Citlalli Rodriguez  1990     Office/Outpatient Visit    Visit Date: Tue, Mar 31, 2020 02:24 pm    Provider: rEin Galvan N.P. (Assistant: Elis Keane MA)    Location: [facility]        Electronically signed by Erin Galvan N.P. on  03/31/2020 08:45:05 PM                             Subjective:        CC: Mrs. Rodriguez is a 30 year old White female.  This is a follow-up visit.  medication refills, discuss medication for anxiety;         HPI:           stable on atenolol per cardiology.  did not want cardiac ablation.            Dx with headache; concerning headache, headahces greatly reduced on amitriptyline tx.  gets 1-2 migraines per month during menses only.  lmp 2 wks ago.  denies side effects.  requests refills.            In regard to the anxiety disorder, unspecified, her anxiety disorder was originally diagnosed more than 15 years ago.  Her symptom complex includes apprehension.  True panic attacks occur in addition to generalized anxiety.  She is not currently being treated for anxiety.  Previous attempts at treatment have included relaxation techniques.  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  worried about covid 19.  she and her family are asymptomatic and have no known exposures.  wakes frequently at night.  would like to try medication for anxiety.  strong family hx of addiction.  does not want any controlled substances.      ROS:     CONSTITUTIONAL:  Positive for fatigue.   Negative for chills or fever.      E/N/T:  Negative for hearing problems, E/N/T pain, congestion, rhinorrhea, epistaxis, hoarseness, and dental problems.      CARDIOVASCULAR:  Negative for chest pain, palpitations, tachycardia, orthopnea, and edema.      RESPIRATORY:  Negative for cough, dyspnea, and hemoptysis.      GASTROINTESTINAL:  Negative for abdominal pain, heartburn, constipation, diarrhea, and stool changes.      MUSCULOSKELETAL:  Negative for arthralgias, back  pain, and myalgias.      NEUROLOGICAL:  Positive for headaches ( migraine ).   Negative for ataxia, dizziness, memory loss, tremor, vertigo or weakness.      PSYCHIATRIC:  Positive for anxiety, feelings of stress and insomnia.   Negative for depression or suicidal thoughts.          Past Medical History / Family History / Social History:         Last Reviewed on 12/31/2019 04:23 PM by Erin Galvan    Past Medical History:                 PAST MEDICAL HISTORY         svt age 8 sees dr hagan once per year     Migraine Headaches: headache frequency averages 2-3 per week;         CURRENT MEDICAL PROVIDERS:    Cardiologist    Obstetrician/Gynecologist         PREVENTIVE HEALTH MAINTENANCE             PAP SMEAR: was last done feb 2019 with normal results abnormal age 16         Surgical History:     NONE         Family History:         Positive for Systemic Lupus Erythematosus ( sister ).      Positive for Anxiety ( sister ).          Social History:         Marital Status:      Children: 2 children         Tobacco/Alcohol/Supplements:     Last Reviewed on 2/04/2020 06:00 PM by Spurling, Sarah C    Tobacco: She has never smoked.  Non-drinker     Caffeine:  She admits to consuming caffeine via coffee ( 1 serving per day ).          Current Problems:     Last Reviewed on 3/31/2020 02:42 PM by Erin Galvan    Supraventricular tachycardia    Headache        Immunizations:     influenza, injectable, quadrivalent, preservative free (FLUZONE QUAD [id number]) 12/31/2019        Allergies:     Last Reviewed on 2/04/2020 06:00 PM by Spurling, Sarah C    Penicillins:      Promethazine HCl:      Zomig: Nausea  (Adverse Reaction)        Current Medications:     Last Reviewed on 3/31/2020 02:25 PM by Elis Keane    Atenolol 50 mg oral tablet [1 tab daily ]    Mononessa (28) 0.25-35 mg-mcg oral tablet [Take 1 tablet(s) by mouth daily as directed.]    amitriptyline 10 mg oral tablet [TAKE 1 TABLET BY MOUTH AT  BEDTIME]        Assessment:         I47.1   Supraventricular tachycardia       R51   Headache       F41.9   Anxiety disorder, unspecified           ORDERS:         Meds Prescribed:       [Refilled] amitriptyline 10 mg oral tablet [TAKE 1 TABLET BY MOUTH AT BEDTIME], #90 (ninety) tablets, Refills: 1 (one)       [New Rx] busPIRone 5 mg oral tablet [take1-2 tablets twice daily prn anxiety], #60 (sixty) tablets, Refills: 1 (one)                 Plan:         Supraventricular tachycardiacontinue per cardiology    Telehealth: Verbal consent obtained for visit to occur via phone call; Total time spent was 15 minutes; [id number]--Hpktcjeem E/M 11-20 minutes         Headache          Prescriptions:       [Refilled] amitriptyline 10 mg oral tablet [TAKE 1 TABLET BY MOUTH AT BEDTIME], #90 (ninety) tablets, Refills: 1 (one)         Anxiety disorder, unspecified        RECOMMENDATIONS given include: avoidance of caffeine, stress reduction, and increase exercise.  follow up if not improving..            Prescriptions:       [New Rx] busPIRone 5 mg oral tablet [take1-2 tablets twice daily prn anxiety], #60 (sixty) tablets, Refills: 1 (one)             Patient Recommendations:        For  Anxiety disorder, unspecified:    Try to avoid or reduce the amount of caffeine intake. Try stress reduction methods to reduce the frequency or lessen the severity of anxiety episodes.              Charge Capture:         Primary Diagnosis:     I47.1  Supraventricular tachycardia           Orders:      [id number]  [facility] telephone evaluation 11-20 minutes  (In-House)              R51  Headache     F41.9  Anxiety disorder, unspecified

## 2021-05-18 NOTE — PROGRESS NOTES
Citlalli RodriguezBlaze 1990     Office/Outpatient Visit    Visit Date: Fri, Aug 31, 2018 03:03 pm    Provider: Erin Galvan N.P. (Assistant: Jim Godwin LPN)    Location: [facility]        Electronically signed by Erin Galvan N.P. on  08/31/2018 10:09:56 PM                             SUBJECTIVE:        CC:     Ms. Rodriguez is a 28 year old White female.  This is her first visit to the clinic.  here to establish care;         HPI:     lmp last week     Patient complains of headache.  Typical headache frequency is 2-3 per week.   The duration of each episode is usually 2 hours.   She has not had a formal headache work-up done previously.  The headache is exacerbated with stress.  It is improved with plain excedrin migraine- not excedrin migraine.  Pertinent past medical history includes frequent analgesic use.  not current Ms. Rodriguez denies recent travel.  No family members or other contacts are similarly ill.          states hands and feet were swollen and painful for a couple of hours one day this week.  denies tick bite, rash, or fever.  no other episodes of arhtralgia.          stable on atenolol per cardiology.  did not want a cardaic ablation.      ROS:     CONSTITUTIONAL:  Negative for chills, fatigue, fever, and weight change.      CARDIOVASCULAR:  Positive for pedal edema.   Negative for chest pain, dizziness or palpitations.      RESPIRATORY:  Negative for cough, dyspnea, and hemoptysis.      GASTROINTESTINAL:  Negative for abdominal pain, heartburn, constipation, diarrhea, and stool changes.      MUSCULOSKELETAL:  Positive for arthralgias (hands and feet one day only).   Negative for myalgias.      INTEGUMENTARY/BREAST:  Negative for rash.      NEUROLOGICAL:  Positive for headaches.   Negative for ataxia, dizziness, fainting, memory loss, paresthesias, seizures, tremor, vertigo or weakness.      ENDOCRINE:  Negative for hair loss, heat/cold intolerance, polydipsia, and polyphagia.       PSYCHIATRIC:  Negative for anxiety, depression, and sleep disturbances.          PMH/FMH/SH:     Last Reviewed on 8/31/2018 03:30 PM by Erin Galvan    Past Medical History:                 PAST MEDICAL HISTORY         svt age 8 sees dr hagan once per year     Migraine Headaches: headache frequency averages 2-3 per week;         CURRENT MEDICAL PROVIDERS:    Cardiologist    Obstetrician/Gynecologist         Surgical History:     NONE         Family History:         Positive for Systemic Lupus Erythematosus ( sister ).      Positive for Anxiety ( sister ).          Social History:         Marital Status:      Children: 2 children         Tobacco/Alcohol/Supplements:     Last Reviewed on 8/31/2018 03:12 PM by Jim Godwin    Tobacco: She has never smoked.  Non-drinker     Caffeine:  She admits to consuming caffeine via coffee ( 1 serving per day ).              Current Problems:       None Recorded         Immunizations:     None        Allergies:     Last Reviewed on 8/31/2018 03:06 PM by Jim Godwin    Penicillins:    Promethazine HCl:        Current Medications:     Last Reviewed on 8/31/2018 03:08 PM by Jim Godwin    MonoNessa Tablet Take 1 tablet(s) by mouth daily as directed.     Atenolol 50mg Tablet 1 tab daily         OBJECTIVE:        Vitals:         Current: 8/31/2018 3:04:56 PM    Ht:  5 ft, 3.5 in;  Wt: 142.6 lbs;  BMI: 24.9    T: 98.3 F (oral);  BP: 106/72 mm Hg (left arm, sitting);  P: 63 bpm (finger clip, sitting)    LMP:  08/06/2018        Exams:     PHYSICAL EXAM:     GENERAL:  well developed and nourished; appropriately groomed; in no apparent distress;     EYES: PERRL, EOMI     E/N/T: EARS: bilateral TMs are normal;  NOSE: normal nasal mucosa; OROPHARYNX: posterior pharynx, including tonsils, tongue, and uvula are normal;     NECK: thyroid is non-palpable;     RESPIRATORY: normal respiratory rate and pattern with no distress; normal breath sounds with no  rales, rhonchi, wheezes or rubs;     CARDIOVASCULAR: normal rate; rhythm is regular;     Peripheral Pulses: radial: 2+ amplitude, no bruits; posterior tibial: 2+ amplitude, no bruits; no edema;     LYMPHATIC: no enlargement of cervical or facial nodes;     MUSCULOSKELETAL:  Normal range of motion, strength and tone;     NEUROLOGIC: mental status: alert and oriented x 3; GROSSLY INTACT     PSYCHIATRIC:  appropriate affect and demeanor; normal speech pattern; grossly normal memory;         ASSESSMENT           784.0   R51  Headache              DDx:     782.3   R60.0  Edema              DDx:     427.0   I47.1  SVT              DDx:     719.49   M25.50  Diffuse arthralgia              DDx:         ORDERS:         Meds Prescribed:       Zomig (Zolmitriptan) 2.5mg Tablet Take 1 tablet(s) by mouth at onset may repeat dose after 2 hours. Do not exceed 2 tabs within a 24 hour period.  #6 (Six) tablet(s) Refills: 1         Lab Orders:       [id number]  COMP [facility] Comp. Metabolic Panel  (Send-Out)         65975  TSH - [facility] TSH  (Send-Out)         26337  RAPII - [facility] Arthritis Profile  (Send-Out)                   PLAN:          Headache         RECOMMENDATIONS given include: increase oral fluid intake, maintain normal sleep patterns, and consider neurology referral or trial of amitriptyline or topamax.  using  analgesics too often and likely having reboudn component to her headaches..            Prescriptions:       Zomig (Zolmitriptan) 2.5mg Tablet Take 1 tablet(s) by mouth at onset may repeat dose after 2 hours. Do not exceed 2 tabs within a 24 hour period.  #6 (Six) tablet(s) Refills: 1           Patient Education Handouts:       Cluster Headache        Migraine Headache        Tension Headache           Edema     LABORATORY:  Labs ordered to be performed today include Comprehensive metabolic panel and TSH.      RECOMMENDATIONS given include: restriction of sodium intake and labs pending..            Orders:       [id number]  COMP   [facility] Comp. Metabolic Panel  (Send-Out)         66565  TSH - [facility] TSH  (Send-Out)            SVT cardiology          Diffuse arthralgia     LABORATORY:  Labs ordered to be performed today include Arthritis Profile.            Orders:       [id number]  [facility] Arthritis Profile  (Send-Out)               Patient Recommendations:        For  Headache:     Drink more liquids. Maintain normal sleep patterns, avoid lack of sleep, fatigue or oversleep.          For  Edema:     Restrict the use of salt in your diet.          For  Diffuse arthralgia:     I also recommend ^.              CHARGE CAPTURE           **Please note: ICD descriptions below are intended for billing purposes only and may not represent clinical diagnoses**        Primary Diagnosis:         784.0 Headache            R51    Headache              Orders:          [id number]   Office visit - new pt, level 2  (In-House)           782.3 Edema            R60.0    Localized edema    427.0 SVT            I47.1    Supraventricular tachycardia    719.49 Diffuse arthralgia            M25.50    Pain in unspecified joint

## 2021-05-18 NOTE — PROGRESS NOTES
Citlalli RodriguezBlaze 1990     Office/Outpatient Visit    Visit Date: Tue, Oct 2, 2018 06:18 pm    Provider: Erin Galvan N.P. (Assistant: Anna Haynes MA)    Location: [facility]        Electronically signed by Erin Galvan N.P. on  10/03/2018 08:39:08 AM                             SUBJECTIVE:        CC:     Ms. Rodriguez is a 28 year old White female.  She presents with fever, body aches, chills, sore throat.          HPI:         2 days ago noted temp 102 with body aches, chills , and sore throat.  exposed to child with strep.  taking excedrin prn.      see HPI above     ROS:     CONSTITUTIONAL:  Positive for chills, fatigue and fever.      E/N/T:  Positive for sore throat.   Negative for ear pain, nasal congestion or frequent rhinorrhea.      CARDIOVASCULAR:  Negative for chest pain, palpitations, tachycardia, orthopnea, and edema.      RESPIRATORY:  Negative for cough, dyspnea, and hemoptysis.      GASTROINTESTINAL:  Negative for abdominal pain, heartburn, constipation, diarrhea, and stool changes.      GENITOURINARY:  Negative for dysuria and frequent urination.      MUSCULOSKELETAL:  Positive for myalgias.   Negative for arthralgias or back pain.      INTEGUMENTARY/BREAST:  Negative for rash.      NEUROLOGICAL:  Positive for headaches.   Negative for dizziness or paresthesias.      PSYCHIATRIC:  Negative for anxiety, depression, and sleep disturbances.          PMH/FMH/SH:     Last Reviewed on 8/31/2018 03:30 PM by Erin Galvan    Past Medical History:                 PAST MEDICAL HISTORY         svt age 8 sees dr hagan once per year     Migraine Headaches: headache frequency averages 2-3 per week;         CURRENT MEDICAL PROVIDERS:    Cardiologist    Obstetrician/Gynecologist         Surgical History:     NONE         Family History:         Positive for Systemic Lupus Erythematosus ( sister ).      Positive for Anxiety ( sister ).          Social History:         Marital Status:       Children: 2 children         Tobacco/Alcohol/Supplements:     Last Reviewed on 8/31/2018 03:12 PM by Jim Godwin    Tobacco: She has never smoked.  Non-drinker     Caffeine:  She admits to consuming caffeine via coffee ( 1 serving per day ).              Current Problems:     Diffuse arthralgia     SVT     Edema     Headache         Immunizations:     None        Allergies:     Last Reviewed on 8/31/2018 03:06 PM by Jim Godwin    Penicillins:    Promethazine HCl:    Zomig: nausea (Adverse Reaction)        Current Medications:     Last Reviewed on 8/31/2018 03:08 PM by Jim Godwin    MonoNessa Tablet Take 1 tablet(s) by mouth daily as directed.     Amitriptyline HCl 10mg Tablet 1 tablet at bedtime     Atenolol 50mg Tablet 1 tab daily         OBJECTIVE:        Vitals:         Historical:     08/31/2018  BP:   106/72 mm Hg ( (left arm, , sitting, );)     08/31/2018  Wt:   142.6lbs        Current: 10/2/2018 6:23:01 PM    Ht:  5 ft, 3.5 in;  Wt: 142 lbs;  BMI: 24.8    T: 100.2 F (oral);  BP: 126/66 mm Hg (right arm, sitting);  P: 110 bpm (right arm (BP Cuff), sitting);  sCr: 0.69 mg/dL;  GFR: 115.11        Exams:     PHYSICAL EXAM:     GENERAL: tired-appearing;     E/N/T: EARS: bilateral TMs are normal;  NOSE: normal nasal mucosa; OROPHARYNX: posterior pharynx shows erythematous anterior tonsillar pillars;     RESPIRATORY: normal respiratory rate and pattern with no distress; normal breath sounds with no rales, rhonchi, wheezes or rubs;     CARDIOVASCULAR: normal rate; rhythm is regular;     LYMPHATIC: no enlargement of cervical or facial nodes;     MUSCULOSKELETAL:  Normal range of motion, strength and tone;     NEUROLOGIC: mental status: alert and oriented x 3; GROSSLY INTACT     PSYCHIATRIC:  appropriate affect and demeanor; normal speech pattern; grossly normal memory;         Lab/Test Results:             Influenza A and B:  Negative (10/02/2018),     Performed by::  adelita  (10/02/2018),     Rapid Strep Screen:  Negative (10/02/2018),             ASSESSMENT           780.60   R50.9  Fever, unspecified              DDx:     462   J02.9  Sore throat              DDx:         ORDERS:         Lab Orders:       [id number]  Infectious agent antigen detection by immunoassay; Influenza  (In-House)         50102  Infectious agent antigen detection by immunoassay; Influenza  (In-House)         25188  Group A Streptococcus detection by immunoassay with direct optical observation  (In-House)         57536  [facility] Throat culture, strep  (Send-Out)                   PLAN:          Fever, unspecified           Orders:       [id number]  Infectious agent antigen detection by immunoassay; Influenza  (In-House)         91785  Infectious agent antigen detection by immunoassay; Influenza  (In-House)         65851  Group A Streptococcus detection by immunoassay with direct optical observation  (In-House)         33455  [facility] Throat culture, strep  (Send-Out)            Sore throat         RECOMMENDATIONS given include: rest, increase oral fluid intake, reduce fever with acetaminophen or ibuprofen, Salt water gargle, and symptomatic tx.  follow up if not improving..            Patient Education Handouts:       Sore Throat (Pharyngitis)              Patient Recommendations:        For  Sore throat:     Get plenty of rest. Increase oral fluid intake. Reduce fever as needed with acetaminophen (Tylenol) or ibuprofen (Motrin, Advil, etc.). Make salt water solution by combining 1/2 to 1 teaspoons of table salt with 8 ounces of warm water.  Gargle for 10 seconds and spit out salt water.  Repeat several times a day as needed.              CHARGE CAPTURE           **Please note: ICD descriptions below are intended for billing purposes only and may not represent clinical diagnoses**        Primary Diagnosis:         780.60 Fever, unspecified            R50.9    Fever, unspecified              Orders:          [id number]    Office/outpatient visit; established patient, level 3  (In-House)             45834   Infectious agent antigen detection by immunoassay; Influenza  (In-House)             55530   Infectious agent antigen detection by immunoassay; Influenza  (In-House)             66131   Group A Streptococcus detection by immunoassay with direct optical observation  (In-House)           462 Sore throat            J02.9    Acute pharyngitis, unspecified

## 2021-05-18 NOTE — PROGRESS NOTES
Citlalli Rodriguez SABRINA 1990     Office/Outpatient Visit    Visit Date: Wed, Mar 27, 2019 01:32 pm    Provider: Erin Galvan N.P. (Assistant: Sarah Spurling, MA)    Location: [facility]        Electronically signed by Erin Galvan N.P. on  03/27/2019 09:28:40 PM                             SUBJECTIVE:        CC:     Ms. Rodriguez is a 29 year old White female.  pt is here for an ingrown toenail;         HPI:         Ms. Rodriguez presents with toe pain.  Today's visit is for evaluation of the right foot.  The location of the discomfort is primarily the right great toe.  The pain does not radiate.  The pain initially began yesterday.  Other details: had pedicure yesterday.  right great toenail bed redness lateral..          Dx with headache; greatly improved with amitriptyline q hs.  needs refill.  only gets headache with onset of menses.  lmp last week.      ROS:     CONSTITUTIONAL:  Negative for chills, fatigue, fever, and weight change.      CARDIOVASCULAR:  Negative for chest pain, palpitations, tachycardia, orthopnea, and edema.      RESPIRATORY:  Negative for cough, dyspnea, and hemoptysis.      MUSCULOSKELETAL:  Negative for arthralgias, back pain, and myalgias.      INTEGUMENTARY/BREAST:  Positive for right great toenail redness.      NEUROLOGICAL:  Positive for headaches ( migraine ).   Negative for dizziness, fainting or weakness.          PMH/FMH/SH:     Last Reviewed on 8/31/2018 03:30 PM by Erin Galvan    Past Medical History:                 PAST MEDICAL HISTORY         svt age 8 sees dr hagan once per year     Migraine Headaches: headache frequency averages 2-3 per week;         CURRENT MEDICAL PROVIDERS:    Cardiologist    Obstetrician/Gynecologist         Surgical History:     NONE         Family History:         Positive for Systemic Lupus Erythematosus ( sister ).      Positive for Anxiety ( sister ).          Social History:         Marital Status:      Children: 2 children          Tobacco/Alcohol/Supplements:     Last Reviewed on 10/02/2018 06:22 PM by Anna Haynes    Tobacco: She has never smoked.  Non-drinker     Caffeine:  She admits to consuming caffeine via coffee ( 1 serving per day ).              Current Problems:     Fever, unspecified     Diffuse arthralgia     SVT         Immunizations:     None        Allergies:     Last Reviewed on 10/02/2018 06:21 PM by Anna Haynes    Penicillins:    Promethazine HCl:    Zomig: nausea (Adverse Reaction)        Current Medications:     Last Reviewed on 3/27/2019 01:36 PM by Spurling, Sarah C    MonoNessa Tablet Take 1 tablet(s) by mouth daily as directed.     Atenolol 50mg Tablet 1 tab daily     Amitriptyline HCl 10mg Tablet Take 1 tablet(s) by mouth at bedtime         OBJECTIVE:        Vitals:         Historical:     10/02/2018  BP:   126/66 mm Hg ( (right arm, , sitting, );)     10/02/2018  Wt:   142lbs        Current: 3/27/2019 1:38:10 PM    Ht:  5 ft, 3.5 in;  Wt: 144.4 lbs;  BMI: 25.2    T: 97.6 F (oral);  BP: 126/77 mm Hg (right arm, sitting);  P: 70 bpm (right arm (BP Cuff), sitting);  sCr: 0.69 mg/dL;  GFR: 114.91        Exams:     PHYSICAL EXAM:     GENERAL:  well developed and nourished; appropriately groomed; in no apparent distress;     RESPIRATORY: normal respiratory rate and pattern with no distress; normal breath sounds with no rales, rhonchi, wheezes or rubs;     CARDIOVASCULAR: normal rate; rhythm is regular;     Peripheral Pulses: dorsalis pedis: 2+ R;  no edema;     BREAST/INTEGUMENT: paronychia right great toe;     MUSCULOSKELETAL:  Normal range of motion, strength and tone;     NEUROLOGIC: mental status: alert and oriented x 3; GROSSLY INTACT     PSYCHIATRIC:  appropriate affect and demeanor; normal speech pattern; grossly normal memory;         ASSESSMENT           681.11   L03.031  Paronychia of toe              DDx:     784.0   R51  Headache              DDx:         ORDERS:         Meds Prescribed:        Keflex (Cephalexin) 500mg Capsules 1 cap bid for 10 days  #20 (Twenty) capsule(s) Refills: 0       Mupirocin 2% Topical Ointment apply affected area bid  #15 (Fifteen) gm Refills: 0       Refill of: Amitriptyline HCl 10mg Tablet Take 1 tablet(s) by mouth at bedtime  #90 (Ninety) tablet(s) Refills: 1                 PLAN:          Paronychia of toe         RECOMMENDATIONS given include: cut toenails straight across when trimming and warm epsom salt soak daily..            Prescriptions:       Keflex (Cephalexin) 500mg Capsules 1 cap bid for 10 days  #20 (Twenty) capsule(s) Refills: 0       Mupirocin 2% Topical Ointment apply affected area bid  #15 (Fifteen) gm Refills: 0          Headache         getting menstrual migraines currently.  menses regular.  start iibuprofen or aleve 24 hrs prior to onset of menses.  take with food.  call if not controlled.            Prescriptions:       Refill of: Amitriptyline HCl 10mg Tablet Take 1 tablet(s) by mouth at bedtime  #90 (Ninety) tablet(s) Refills: 1             Patient Recommendations:        For  Paronychia of toe:     When trimming toenails, cut straight across rather than curving downward at the edges.              CHARGE CAPTURE           **Please note: ICD descriptions below are intended for billing purposes only and may not represent clinical diagnoses**        Primary Diagnosis:         681.11 Paronychia of toe            L03.031    Cellulitis of right toe              Orders:          [id number]   Office/outpatient visit; established patient, level 3  (In-House)           784.0 Headache            R51    Headache

## 2021-05-18 NOTE — PROGRESS NOTES
Citlalli Rodriguez  1990     Office/Outpatient Visit    Visit Date: Tue, Dec 31, 2019 03:46 pm    Provider: Erin Galvan N.P. (Assistant: Spurling, Sarah C, MA)    Location: [facility]        Electronically signed by Erin Galvan N.P. on  12/31/2019 08:25:27 PM                             Subjective:        CC: Ms. Rodriguez is a 29 year old White female.  This is a follow-up visit.  and wants to get her flu vaccine, and she mel going on a trip at the end of jan and  she gets really bad nausea.;         HPI:           to soon travel to [facility].  hx motion sickness on long car rides, plane or boat rides.  requests rx for motion sickness.            Concerning headache, headahces greatly reduced on amitriptyline tx.  gets 1-2 migraines per month during menses only.  lmp 2 wks ago.  denies side effects.  requests refills.      ROS:     CONSTITUTIONAL:  Negative for chills, fatigue, fever, and weight change.      CARDIOVASCULAR:  Negative for chest pain, palpitations, tachycardia, orthopnea, and edema.      RESPIRATORY:  Negative for cough, dyspnea, and hemoptysis.      MUSCULOSKELETAL:  Negative for arthralgias, back pain, and myalgias.      NEUROLOGICAL:  Positive for headaches.   Negative for ataxia, dizziness, paresthesias or weakness.      ENDOCRINE:  Negative for hair loss, heat/cold intolerance, polydipsia, and polyphagia.      PSYCHIATRIC:  Negative for anxiety, depression, and sleep disturbances.          Past Medical History / Family History / Social History:         Last Reviewed on 12/31/2019 04:23 PM by Erin Galvan    Past Medical History:                 PAST MEDICAL HISTORY         svt age 8 sees dr hagan once per year     Migraine Headaches: headache frequency averages 2-3 per week;         CURRENT MEDICAL PROVIDERS:    Cardiologist    Obstetrician/Gynecologist         PREVENTIVE HEALTH MAINTENANCE             PAP SMEAR: was last done feb 2019 with normal results abnormal age  16         Surgical History:     NONE         Family History:         Positive for Systemic Lupus Erythematosus ( sister ).      Positive for Anxiety ( sister ).          Social History:         Marital Status:      Children: 2 children         Tobacco/Alcohol/Supplements:     Last Reviewed on 12/31/2019 03:46 PM by Spurling, Sarah C    Tobacco: She has never smoked.  Non-drinker     Caffeine:  She admits to consuming caffeine via coffee ( 1 serving per day ).          Current Problems:     Last Reviewed on 12/31/2019 04:18 PM by Erin Galvan    Supraventricular tachycardia    Pain in unspecified joint    Motion sickness, initial encounter        Immunizations:     None        Allergies:     Last Reviewed on 3/27/2019 01:35 PM by Spurling, Sarah C    Penicillins:      Promethazine HCl:      Zomig: Nausea  (Adverse Reaction)        Current Medications:     Last Reviewed on 12/31/2019 03:58 PM by Spurling, Sarah C    Atenolol 50 mg oral tablet [1 tab daily ]    Mononessa (28) 0.25-35 mg-mcg oral tablet [Take 1 tablet(s) by mouth daily as directed.]    amitriptyline 10 mg oral tablet [TAKE 1 TABLET BY MOUTH AT BEDTIME]        Objective:        Vitals:         Current: 12/31/2019 4:01:08 PM    Ht:  5 ft, 3.5 in;  Wt: 154.4 lbs;  BMI: 26.9T: 98.2 F (oral);  BP: 128/89 mm Hg (right arm, sitting);  P: 62 bpm (right arm (BP Cuff), sitting);  sCr: 0.69 mg/dL;  GFR: 118.23        Exams:     PHYSICAL EXAM:     GENERAL:  well developed and nourished; appropriately groomed; in no apparent distress;     RESPIRATORY: normal respiratory rate and pattern with no distress; normal breath sounds with no rales, rhonchi, wheezes or rubs;     CARDIOVASCULAR: normal rate; rhythm is regular;     MUSCULOSKELETAL:  Normal range of motion, strength and tone;     NEUROLOGIC: mental status: alert and oriented x 3; GROSSLY INTACT     PSYCHIATRIC:  appropriate affect and demeanor; normal speech pattern; grossly normal memory;          Assessment:         T75.3XXA   Motion sickness, initial encounter       R51   Headache       Z23   Encounter for immunization           ORDERS:         Meds Prescribed:       [New Rx] scopolamine base 1 mg over 3 days Transdermal Patch,Transdermal 3 day [apply 1 patch by transdermal route to the hairless area behind 1 ear at least 4 hr before effect is required; reapply every 3 days as needed], #4 (four) patches, Refills: 0 (zero)       [Refilled] amitriptyline 10 mg oral tablet [TAKE 1 TABLET BY MOUTH AT BEDTIME], #90 (ninety) tablets, Refills: 2 (two)         Procedures Ordered:       52811  Immunization administration; one vaccine  (In-House)              Other Orders:       24324  Influenza virus vaccine, quadrivalent, split virus, preservative free 3 years of age & older  (In-House)                      Plan:         Motion sickness, initial encounter          Prescriptions:       [New Rx] scopolamine base 1 mg over 3 days Transdermal Patch,Transdermal 3 day [apply 1 patch by transdermal route to the hairless area behind 1 ear at least 4 hr before effect is required; reapply every 3 days as needed], #4 (four) patches, Refills: 0 (zero)         Headache    MIPS Vaccines Flu and Pneumonia updated in Shot record           Prescriptions:       [Refilled] amitriptyline 10 mg oral tablet [TAKE 1 TABLET BY MOUTH AT BEDTIME], #90 (ninety) tablets, Refills: 2 (two)         Encounter for immunization        RECOMMENDATIONS given include: follow up in 9 months or sooner if concerns.  advise annual physical and check labs especially fasting lipid panel..            Immunizations:       56796  Influenza virus vaccine, quadrivalent, split virus, preservative free 3 years of age & older  (In-House)                Dose (ml): 0.5  Site: right deltoid  Route: intramuscular  Administered by: Spurling, Sarah C          : [employer]  Lot #: [id number]  Exp: 06/30/2020          NDC: [address]-37      83905   Immunization administration; one vaccine  (In-House)                  Charge Capture:         Primary Diagnosis:     T75.3XXA  Motion sickness, initial encounter           Orders:      01869  Office/outpatient visit; established patient, level 3  (In-House)              R51  Headache     Z23  Encounter for immunization           Orders:      92659  Influenza virus vaccine, quadrivalent, split virus, preservative free 3 years of age & older  (In-House)            21740  Immunization administration; one vaccine  (In-House)

## 2021-05-18 NOTE — PROGRESS NOTES
Citlalli Rodriguez  1990     Office/Outpatient Visit    Visit Date: Tue, Feb 4, 2020 06:00 pm    Provider: Erin Galvan N.P. (Assistant: Spurling, Sarah C, MA)    Location: [facility]        Electronically signed by Erin Galvan N.P. on  02/04/2020 09:15:19 PM                             Subjective:        CC: Mrs. Rodriguez is a 30 year old White female.  She presents with sore throat.          HPI:           She complains of a sore throat.  This began yesterday.  The discomfort occurs constantly.  Associated symptoms include headache and malaise.  She denies cough or rhinorrhea.  She has been exposed to strep from son.  She has already tried to relieve the symptoms with acetaminophen.      ROS:     CONSTITUTIONAL:  Positive for fatigue.   Negative for fever.      E/N/T:  Positive for sore throat.   Negative for nasal congestion or frequent rhinorrhea.      CARDIOVASCULAR:  Negative for chest pain, palpitations, tachycardia, orthopnea, and edema.      RESPIRATORY:  Negative for cough, dyspnea, and hemoptysis.      GASTROINTESTINAL:  Negative for abdominal pain, heartburn, constipation, diarrhea, and stool changes.      MUSCULOSKELETAL:  Negative for arthralgias, back pain, and myalgias.      NEUROLOGICAL:  Positive for headaches.   Negative for dizziness.          Past Medical History / Family History / Social History:         Last Reviewed on 12/31/2019 04:23 PM by Erin Galvan    Past Medical History:                 PAST MEDICAL HISTORY         svt age 8 sees dr hagan once per year     Migraine Headaches: headache frequency averages 2-3 per week;         CURRENT MEDICAL PROVIDERS:    Cardiologist    Obstetrician/Gynecologist         PREVENTIVE HEALTH MAINTENANCE             PAP SMEAR: was last done feb 2019 with normal results abnormal age 16         Surgical History:     NONE         Family History:         Positive for Systemic Lupus Erythematosus ( sister ).      Positive for  Anxiety ( sister ).          Social History:         Marital Status:      Children: 2 children         Tobacco/Alcohol/Supplements:     Last Reviewed on 12/31/2019 03:46 PM by Spurling, Sarah C    Tobacco: She has never smoked.  Non-drinker     Caffeine:  She admits to consuming caffeine via coffee ( 1 serving per day ).          Current Problems:     Last Reviewed on 12/31/2019 04:18 PM by Erin Galvan    Supraventricular tachycardia    Pain in unspecified joint    Headache    Motion sickness, initial encounter    Encounter for immunization        Immunizations:     influenza, injectable, quadrivalent, preservative free (FLUZONE QUAD [id number]) 12/31/2019        Allergies:     Last Reviewed on 12/31/2019 03:46 PM by Spurling, Sarah C    Penicillins:      Promethazine HCl:      Zomig: Nausea  (Adverse Reaction)        Current Medications:     Last Reviewed on 2/04/2020 06:03 PM by Spurling, Sarah C    Atenolol 50 mg oral tablet [1 tab daily ]    Mononessa (28) 0.25-35 mg-mcg oral tablet [Take 1 tablet(s) by mouth daily as directed.]    amitriptyline 10 mg oral tablet [TAKE 1 TABLET BY MOUTH AT BEDTIME]        Objective:        Vitals:         Historical:     12/31/2019  BP:   128/89 mm Hg ( (right arm, , sitting, );) 12/31/2019  Wt:   154.4lbs    Current: 2/4/2020 6:08:49 PM    Ht:  5 ft, 3.5 in;  Wt: 151.8 lbs;  BMI: 26.5T: 97.7 F (oral);  BP: 125/85 mm Hg (right arm, sitting);  P: 68 bpm (right arm (BP Cuff), sitting);  sCr: 0.69 mg/dL;  GFR: 116.34        Exams:     PHYSICAL EXAM:     GENERAL:  well developed and nourished; appropriately groomed; in no apparent distress;     E/N/T: EARS: bilateral TMs are normal;  NOSE: normal nasal mucosa; OROPHARYNX: posterior pharynx shows erythematous anterior tonsillar pillars;     RESPIRATORY: normal respiratory rate and pattern with no distress; normal breath sounds with no rales, rhonchi, wheezes or rubs;     CARDIOVASCULAR: normal rate; rhythm is regular;      LYMPHATIC: no enlargement of cervical or facial nodes;     MUSCULOSKELETAL:  Normal range of motion, strength and tone;     NEUROLOGIC: mental status: alert and oriented x 3; GROSSLY INTACT     PSYCHIATRIC:  appropriate affect and demeanor; normal speech pattern; grossly normal memory;         Lab/Test Results:         Rapid Strep Screen: Negative (02/04/2020),     Performed by:: becca (02/04/2020),             Assessment:         J02.9   Acute pharyngitis, unspecified           ORDERS:         Meds Prescribed:       [New Rx] Keflex 500 mg oral capsule [take 1 capsule (500 mg) by oral route 2 times per day], #20 (twenty) capsules, Refills: 0 (zero)         Lab Orders:       [id number]  Group A Streptococcus detection by immunoassay with direct optical observation  (In-House)                      Plan:         Acute pharyngitis, unspecifiedson with strep.  elects to be treated.  cancelled throat culture order per request.   follow up if not improving.        RECOMMENDATIONS given include: rest, increase oral fluid intake, and Salt water gargle.            Prescriptions:       [New Rx] Keflex 500 mg oral capsule [take 1 capsule (500 mg) by oral route 2 times per day], #20 (twenty) capsules, Refills: 0 (zero)           Orders:       [id number]  Group A Streptococcus detection by immunoassay with direct optical observation  (In-House)                  Patient Recommendations:        For  Acute pharyngitis, unspecified:    Get plenty of rest. Increase oral fluid intake. Make salt water solution by combining 1/2 to 1 teaspoons of table salt with 8 ounces of warm water.  Gargle for 10 seconds and spit out salt water.  Repeat several times a day as needed.              Charge Capture:         Primary Diagnosis:     J02.9  Acute pharyngitis, unspecified           Orders:      74720  Office/outpatient visit; established patient, level 3  (In-House)            09409  Group A Streptococcus detection by immunoassay with direct optical  observation  (In-House)

## 2021-07-01 VITALS
TEMPERATURE: 100.2 F | SYSTOLIC BLOOD PRESSURE: 126 MMHG | DIASTOLIC BLOOD PRESSURE: 66 MMHG | HEIGHT: 64 IN | HEART RATE: 110 BPM | WEIGHT: 142 LBS | BODY MASS INDEX: 24.24 KG/M2

## 2021-07-01 VITALS
HEART RATE: 70 BPM | WEIGHT: 144.4 LBS | DIASTOLIC BLOOD PRESSURE: 77 MMHG | BODY MASS INDEX: 24.65 KG/M2 | HEIGHT: 64 IN | TEMPERATURE: 97.6 F | SYSTOLIC BLOOD PRESSURE: 126 MMHG

## 2021-07-01 VITALS
SYSTOLIC BLOOD PRESSURE: 106 MMHG | HEART RATE: 63 BPM | HEIGHT: 64 IN | TEMPERATURE: 98.3 F | DIASTOLIC BLOOD PRESSURE: 72 MMHG | WEIGHT: 142.6 LBS | BODY MASS INDEX: 24.34 KG/M2

## 2021-07-02 VITALS
WEIGHT: 151.8 LBS | BODY MASS INDEX: 25.92 KG/M2 | HEART RATE: 68 BPM | DIASTOLIC BLOOD PRESSURE: 85 MMHG | TEMPERATURE: 97.7 F | SYSTOLIC BLOOD PRESSURE: 125 MMHG | HEIGHT: 64 IN

## 2021-07-02 VITALS
HEIGHT: 64 IN | DIASTOLIC BLOOD PRESSURE: 89 MMHG | TEMPERATURE: 98.2 F | HEART RATE: 62 BPM | WEIGHT: 154.4 LBS | BODY MASS INDEX: 26.36 KG/M2 | SYSTOLIC BLOOD PRESSURE: 128 MMHG

## 2021-07-02 VITALS
WEIGHT: 151.4 LBS | DIASTOLIC BLOOD PRESSURE: 64 MMHG | BODY MASS INDEX: 25.85 KG/M2 | HEART RATE: 65 BPM | TEMPERATURE: 97.9 F | SYSTOLIC BLOOD PRESSURE: 111 MMHG | HEIGHT: 64 IN

## 2021-07-06 RX ORDER — NORGESTIMATE AND ETHINYL ESTRADIOL 0.25-0.035
KIT ORAL
Qty: 84 TABLET | Refills: 0 | Status: SHIPPED | OUTPATIENT
Start: 2021-07-06 | End: 2021-08-02 | Stop reason: SDUPTHER

## 2021-07-06 NOTE — TELEPHONE ENCOUNTER
Ignacia, needs an new annual appointment. Thanks, Dr. Wiseman  
Med refill. Last AE 8/2020.
Patient notified and scheduled.
No

## 2021-07-17 DIAGNOSIS — F41.9 ANXIETY: Primary | ICD-10-CM

## 2021-08-02 ENCOUNTER — TELEPHONE (OUTPATIENT)
Dept: OBSTETRICS AND GYNECOLOGY | Facility: CLINIC | Age: 31
End: 2021-08-02

## 2021-08-02 RX ORDER — NORGESTIMATE AND ETHINYL ESTRADIOL 0.25-0.035
KIT ORAL
Qty: 84 TABLET | Refills: 0 | Status: SHIPPED | OUTPATIENT
Start: 2021-08-02 | End: 2021-09-14 | Stop reason: SDUPTHER

## 2021-08-02 RX ORDER — NORGESTIMATE AND ETHINYL ESTRADIOL 0.25-0.035
KIT ORAL
Qty: 84 TABLET | Refills: 0 | Status: SHIPPED | OUTPATIENT
Start: 2021-08-02 | End: 2021-09-14

## 2021-08-02 RX ORDER — NORGESTIMATE AND ETHINYL ESTRADIOL 0.25-0.035
1 KIT ORAL DAILY
Qty: 84 TABLET | Refills: 0 | Status: SHIPPED | OUTPATIENT
Start: 2021-08-02 | End: 2021-08-02

## 2021-08-02 NOTE — TELEPHONE ENCOUNTER
Patient called she states that her pharmacy says she is out of refills on her birth control. She is scheduled to be seen in September. We last called in her birth control last month tho and it was a quantity of 84 tablets that should have been enough to last her to her appointment but she is saying that her pharmacy only gave her one months supply when she picked up.

## 2021-08-02 NOTE — TELEPHONE ENCOUNTER
Pharmacy called and they stated that this prescription was deleted out by mistake so that is why they don't show any refills on file for patient. They sent a refill request want to know if we will still send it back because they dont have any on file for patient right now

## 2021-09-14 ENCOUNTER — OFFICE VISIT (OUTPATIENT)
Dept: OBSTETRICS AND GYNECOLOGY | Facility: CLINIC | Age: 31
End: 2021-09-14

## 2021-09-14 VITALS
HEART RATE: 66 BPM | SYSTOLIC BLOOD PRESSURE: 109 MMHG | HEIGHT: 64 IN | DIASTOLIC BLOOD PRESSURE: 74 MMHG | BODY MASS INDEX: 26.8 KG/M2 | WEIGHT: 157 LBS

## 2021-09-14 DIAGNOSIS — Z01.419 ENCOUNTER FOR GYNECOLOGICAL EXAMINATION WITHOUT ABNORMAL FINDING: Primary | ICD-10-CM

## 2021-09-14 DIAGNOSIS — Z30.41 ENCOUNTER FOR SURVEILLANCE OF CONTRACEPTIVE PILLS: ICD-10-CM

## 2021-09-14 PROCEDURE — 99395 PREV VISIT EST AGE 18-39: CPT | Performed by: OBSTETRICS & GYNECOLOGY

## 2021-09-14 RX ORDER — ATENOLOL 50 MG/1
TABLET ORAL
COMMUNITY
Start: 2021-06-21 | End: 2022-10-11

## 2021-09-14 RX ORDER — NORGESTIMATE AND ETHINYL ESTRADIOL 0.25-0.035
1 KIT ORAL DAILY
Qty: 84 TABLET | Refills: 3 | Status: SHIPPED | OUTPATIENT
Start: 2021-09-14 | End: 2022-08-18

## 2021-09-14 NOTE — PROGRESS NOTES
GYN Annual Exam     CC- Here for annual exam.     Citlalli Rodriguez is a 31 y.o. female who presents for annual well woman exam. Periods are regular every 28-30 days, lasting 3 days. Dysmenorrhea:none. Cyclic symptoms include none. No intermenstrual bleeding, spotting, or discharge.    OB History        3    Para   2    Term   2            AB   1    Living   2       SAB   1    TAB        Ectopic        Molar        Multiple   0    Live Births   2                Current contraception: OCP (estrogen/progesterone)  History of abnormal Pap smear: yes - Cryo in the past.   Family history of uterine, colon or ovarian cancer: no  History of abnormal mammogram: no  Family history of breast cancer: no  Last Pap : 2017 NL HPV neg     Past Medical History:   Diagnosis Date   • Abnormal ECG    • Abnormal Pap smear of cervix    • Anxiety    • Chlamydia    • HPV (human papilloma virus) infection    • Migraine    • Ovarian cyst    • Rh incompatibility    • SVT (supraventricular tachycardia) (CMS/HCC)    • Varicella        Past Surgical History:   Procedure Laterality Date   • GYNECOLOGIC CRYOSURGERY     • WISDOM TOOTH EXTRACTION           Current Outpatient Medications:   •  amitriptyline (ELAVIL) 10 MG tablet, TK 1 T PO HS, Disp: , Rfl:   •  atenolol (TENORMIN) 50 MG tablet, , Disp: , Rfl:   •  norgestimate-ethinyl estradiol (Estarylla) 0.25-35 MG-MCG per tablet, Take 1 tablet by mouth Daily., Disp: 84 tablet, Rfl: 3  •  sertraline (Zoloft) 50 MG tablet, Take 1 tablet by mouth Daily., Disp: 90 tablet, Rfl: 0    Allergies   Allergen Reactions   • Penicillins Itching and Rash     Rash   • Promethazine Itching and Rash     Rash,restlessness   • Promethazine Hcl Nausea And Vomiting and Rash       Social History     Tobacco Use   • Smoking status: Never Smoker   • Smokeless tobacco: Never Used   Substance Use Topics   • Alcohol use: No   • Drug use: No       Family History   Problem Relation Age of Onset   • No Known  "Problems Father    • No Known Problems Mother    • Breast cancer Neg Hx    • Ovarian cancer Neg Hx    • Uterine cancer Neg Hx    • Colon cancer Neg Hx    • Deep vein thrombosis Neg Hx    • Pulmonary embolism Neg Hx        Review of Systems   Constitutional: Negative for chills and fever.   Gastrointestinal: Negative for abdominal pain, constipation and diarrhea.   Genitourinary: Negative for pelvic pain, vaginal bleeding and vaginal discharge.   All other systems reviewed and are negative.      /74   Pulse 66   Ht 161.3 cm (63.5\")   Wt 71.2 kg (157 lb)   LMP 08/17/2021 (Exact Date)   Breastfeeding No   BMI 27.38 kg/m²     Physical Exam  Constitutional:       General: She is not in acute distress.     Appearance: She is well-developed and normal weight.   Genitourinary:      Pelvic exam was performed with patient supine.      Vulva, vagina, uterus, right adnexa and left adnexa normal.      No vulval lesion or Bartholin's cyst noted.      No inguinal adenopathy present in the right or left side.     No vaginal discharge or bleeding.      No cervical motion tenderness or friability.      Uterus is not enlarged or tender.      No uterine mass detected.     Uterus is anteverted and regular.      No right or left adnexal mass present.      Right adnexa not tender or full.      Left adnexa not tender or full.   HENT:      Head: Normocephalic and atraumatic.      Nose: Nose normal.   Eyes:      Conjunctiva/sclera: Conjunctivae normal.      Pupils: Pupils are equal, round, and reactive to light.   Neck:      Thyroid: No thyromegaly.   Cardiovascular:      Rate and Rhythm: Normal rate and regular rhythm.      Heart sounds: Normal heart sounds. No murmur heard.     Pulmonary:      Effort: Pulmonary effort is normal. No respiratory distress.      Breath sounds: Normal breath sounds.   Chest:      Breasts:         Right: No inverted nipple, mass or nipple discharge.         Left: No inverted nipple, mass or nipple " discharge.   Abdominal:      General: Abdomen is flat. There is no distension.      Palpations: Abdomen is soft.      Tenderness: There is no abdominal tenderness.   Musculoskeletal:         General: No deformity. Normal range of motion.      Cervical back: Normal range of motion and neck supple.      Right lower leg: No edema.      Left lower leg: No edema.   Lymphadenopathy:      Lower Body: No right inguinal adenopathy. No left inguinal adenopathy.   Neurological:      Mental Status: She is alert and oriented to person, place, and time.   Skin:     General: Skin is warm and dry.      Findings: No erythema.   Psychiatric:         Behavior: Behavior normal.         Thought Content: Thought content normal.         Judgment: Judgment normal.   Vitals reviewed. Exam conducted with a chaperone present.               Assessment     Diagnoses and all orders for this visit:    1. Encounter for gynecological examination without abnormal finding (Primary)  -     IGP, Apt HPV,rfx 16 / 18,45    2. Encounter for surveillance of contraceptive pills    Other orders  -     norgestimate-ethinyl estradiol (Estarylla) 0.25-35 MG-MCG per tablet; Take 1 tablet by mouth Daily.  Dispense: 84 tablet; Refill: 3    1) GYN exam   Expectations reviewed.   2) OCP   Continuation      Plan     1) Breast Health - Clinical breast exam yearly, Discussed American cancer society recommendations for breast cancer screening, and Self breast awareness monthly  2) Pap - updated today   3) Smoking status- non-smoker   4) Encouraged to be wary of information obtained via social media and internet based on source and search.  5) Follow up prn and one year.       Baljit Wiseman MD   9/14/2021  12:11 EDT

## 2021-09-20 DIAGNOSIS — F41.9 ANXIETY: ICD-10-CM

## 2021-09-20 LAB
CYTOLOGIST CVX/VAG CYTO: ABNORMAL
CYTOLOGY CVX/VAG DOC CYTO: ABNORMAL
CYTOLOGY CVX/VAG DOC THIN PREP: ABNORMAL
DX ICD CODE: ABNORMAL
DX ICD CODE: ABNORMAL
HIV 1 & 2 AB SER-IMP: ABNORMAL
HPV I/H RISK 4 DNA CVX QL PROBE+SIG AMP: POSITIVE
HPV16 DNA CVX QL PROBE+SIG AMP: NEGATIVE
HPV18+45 E6+E7 MRNA CVX QL NAA+PROBE: NEGATIVE
OTHER STN SPEC: ABNORMAL
PATHOLOGIST CVX/VAG CYTO: ABNORMAL
RECOM F/U CVX/VAG CYTO: ABNORMAL
STAT OF ADQ CVX/VAG CYTO-IMP: ABNORMAL

## 2021-09-21 ENCOUNTER — TELEPHONE (OUTPATIENT)
Dept: OBSTETRICS AND GYNECOLOGY | Facility: CLINIC | Age: 31
End: 2021-09-21

## 2021-09-21 NOTE — TELEPHONE ENCOUNTER
----- Message from Citlalli Rodriguez sent at 9/21/2021  8:07 AM EDT -----  Regarding: RE: Test Results Question  Contact: [phone]  Good morning Sharyn you can call anytime. So I can schedule coming back in ASAP.     ----- Message -----  From: TIFFANY ALEMAN  Sent: 9/21/21, 8:06 AM  To: Citlalli Rodriguez  Subject: RE: Test Results Question    Hi Citlalli,    Your pap resulted after hours. Not sure if you have ever had an abnormal pap previously, but we will need you to come back in for a Colposcopy. He will use a microscope to take a closer look and see if he sees any suspicious areas.  We are looking for patients who have more moderate to severe dyplasia(abnormalities). You have a more mild abnormalities. Let me know a good time to call you for a follow up appointment.    Thanks,  Sharyn      ----- Message -----       From:Citlalli Rodriguez       Sent:9/20/2021  9:20 PM EDT         To:Baljit Wiseman MD    Subject:Test Results Question    Hello hello,  Just got the results of my pap and see it came back abnormal. Do I need to come back for a follow up?

## 2021-09-21 NOTE — TELEPHONE ENCOUNTER
----- Message from Baljit Wiseman MD sent at 9/21/2021  9:38 AM EDT -----  Sharyn, per [facility], she is aware - pap with ASCUS HPV+, scheduled for colposcopy. Hx of cryo in the past, but 2017 pap was normal. Thanks, Dr. Wiseman   Implemented All Universal Safety Interventions:  [place] to call system. Call bell, personal items and telephone within reach. Instruct patient to call for assistance. Room bathroom lighting operational. Non-slip footwear when patient is off stretcher. Physically safe environment: no spills, clutter or unnecessary equipment. Stretcher in lowest position, wheels locked, appropriate side rails in place.

## 2021-09-23 DIAGNOSIS — F41.9 ANXIETY: ICD-10-CM

## 2021-09-23 RX ORDER — AMITRIPTYLINE HYDROCHLORIDE 10 MG/1
10 TABLET, FILM COATED ORAL NIGHTLY
Qty: 30 TABLET | Refills: 0 | Status: SHIPPED | OUTPATIENT
Start: 2021-09-23 | End: 2021-10-21

## 2021-09-24 RX ORDER — AMITRIPTYLINE HYDROCHLORIDE 10 MG/1
TABLET, FILM COATED ORAL
Qty: 90 TABLET | OUTPATIENT
Start: 2021-09-24

## 2021-10-07 ENCOUNTER — OFFICE VISIT (OUTPATIENT)
Dept: FAMILY MEDICINE CLINIC | Age: 31
End: 2021-10-07

## 2021-10-07 VITALS
HEART RATE: 58 BPM | WEIGHT: 159 LBS | OXYGEN SATURATION: 99 % | TEMPERATURE: 98.4 F | BODY MASS INDEX: 27.72 KG/M2 | DIASTOLIC BLOOD PRESSURE: 61 MMHG | SYSTOLIC BLOOD PRESSURE: 103 MMHG

## 2021-10-07 DIAGNOSIS — J01.90 ACUTE SINUSITIS, RECURRENCE NOT SPECIFIED, UNSPECIFIED LOCATION: Primary | ICD-10-CM

## 2021-10-07 PROBLEM — R01.1 MURMUR: Status: ACTIVE | Noted: 2019-04-09

## 2021-10-07 PROBLEM — I49.3 PREMATURE VENTRICULAR CONTRACTIONS: Status: ACTIVE | Noted: 2020-07-13

## 2021-10-07 PROCEDURE — 99213 OFFICE O/P EST LOW 20 MIN: CPT | Performed by: NURSE PRACTITIONER

## 2021-10-07 RX ORDER — ALBUTEROL SULFATE 90 UG/1
AEROSOL, METERED RESPIRATORY (INHALATION)
COMMUNITY
Start: 2021-09-26 | End: 2021-10-07 | Stop reason: SINTOL

## 2021-10-07 RX ORDER — PSEUDOEPHEDRINE HCL 30 MG
30 TABLET ORAL EVERY 4 HOURS PRN
Qty: 30 TABLET | Refills: 0 | Status: SHIPPED | OUTPATIENT
Start: 2021-10-07 | End: 2021-12-23

## 2021-10-07 RX ORDER — CEPHALEXIN 500 MG/1
500 TABLET ORAL 4 TIMES DAILY
Qty: 40 TABLET | Refills: 0 | Status: SHIPPED | OUTPATIENT
Start: 2021-10-07 | End: 2021-10-17

## 2021-10-07 NOTE — PROGRESS NOTES
Chief Complaint  Citlalli Rodriguez presents to [facility] for Ear Problem    Subjective          History of Present Illness    Citlalli is here today with left ear ache and sinus congestion. She tested positive for covid on 9/26/21 at urgent care. Had loss of taste/smell, headache, fatigue, chills. Those symptoms improved with persisting ear ache and sinus pressure.     Review of Systems      Allergies   Allergen Reactions   • Penicillins Itching and Rash   • Promethazine Itching, Rash and Myalgia   • Promethazine Hcl Nausea And Vomiting and Rash   • Zomig [Zolmitriptan] Nausea Only      Past Medical History:   Diagnosis Date   • Abnormal ECG    • Abnormal Pap smear of cervix    • Anxiety    • Chlamydia    • HPV (human papilloma virus) infection    • Migraine    • Ovarian cyst    • Rh incompatibility    • SVT (supraventricular tachycardia) (HCC)    • Varicella      Current Outpatient Medications   Medication Sig Dispense Refill   • amitriptyline (ELAVIL) 10 MG tablet Take 1 tablet by mouth Every Night. 30 tablet 0   • atenolol (TENORMIN) 50 MG tablet      • norgestimate-ethinyl estradiol (Estarylla) 0.25-35 MG-MCG per tablet Take 1 tablet by mouth Daily. 84 tablet 3   • sertraline (Zoloft) 50 MG tablet Take 1 and 1/2 tablet once daily 135 tablet 0   • Cephalexin 500 MG tablet Take 500 mg by mouth 4 (Four) Times a Day for 10 days. 40 tablet 0   • pseudoephedrine (Sudafed) 30 MG tablet Take 1 tablet by mouth Every 4 (Four) Hours As Needed for Congestion. 30 tablet 0     No current facility-administered medications for this visit.     Past Surgical History:   Procedure Laterality Date   • GYNECOLOGIC CRYOSURGERY     • WISDOM TOOTH EXTRACTION        Social History     Tobacco Use   • Smoking status: Never Smoker   • Smokeless tobacco: Never Used   Vaping Use   • Vaping Use: Never used   Substance Use Topics   • Alcohol use: No   • Drug use: No     Family History   Problem Relation Age of Onset    • No Known Problems Father    • No Known Problems Mother    • Breast cancer Neg Hx    • Ovarian cancer Neg Hx    • Uterine cancer Neg Hx    • Colon cancer Neg Hx    • Deep vein thrombosis Neg Hx    • Pulmonary embolism Neg Hx      Health Maintenance Due   Topic Date Due   • ANNUAL PHYSICAL  Never done   • HEPATITIS C SCREENING  Never done   • INFLUENZA VACCINE  08/01/2021      Immunization History   Administered Date(s) Administered   • COVID-19 (MODERNA) 09/23/2021   • Flucelvax Quad Vial =>4yrs 10/01/2020   • Influenza TIV (IM) 10/22/2013   • MMR 10/23/2013   • Rho (D) Immune Globulin 03/19/2013, 08/02/2013   • Tdap 10/22/2013        Objective     Vitals:    10/07/21 1323   BP: 103/61   Pulse: 58   Temp: 98.4 °F (36.9 °C)   SpO2: 99%   Weight: 72.1 kg (159 lb)     Body mass index is 27.72 kg/m².     Physical Exam  Vitals reviewed.   Constitutional:       General: She is not in acute distress.     Appearance: Normal appearance. She is well-developed.   HENT:      Head: Normocephalic and atraumatic.      Right Ear: Tympanic membrane and ear canal normal.      Left Ear: Tympanic membrane is erythematous.      Nose: Congestion present.      Right Sinus: Maxillary sinus tenderness and frontal sinus tenderness present.      Left Sinus: Maxillary sinus tenderness and frontal sinus tenderness present.      Mouth/Throat:      Mouth: Mucous membranes are moist.   Cardiovascular:      Rate and Rhythm: Normal rate and regular rhythm.   Pulmonary:      Effort: Pulmonary effort is normal.      Breath sounds: Normal breath sounds.   Neurological:      Mental Status: She is alert and oriented to person, place, and time.   Psychiatric:         Mood and Affect: Mood and affect normal.           Result Review :                               Assessment and Plan      Diagnoses and all orders for this visit:    1. Acute sinusitis, recurrence not specified, unspecified location (Primary)  Comments:  Drink plenty of fluids. Sinus  rinse. Has previously tolerated cephelexin.  Orders:  -     Cephalexin 500 MG tablet; Take 500 mg by mouth 4 (Four) Times a Day for 10 days.  Dispense: 40 tablet; Refill: 0  -     pseudoephedrine (Sudafed) 30 MG tablet; Take 1 tablet by mouth Every 4 (Four) Hours As Needed for Congestion.  Dispense: 30 tablet; Refill: 0              Follow Up     Return for As needed for persistent or worsening symptoms.

## 2021-10-08 ENCOUNTER — PROCEDURE VISIT (OUTPATIENT)
Dept: OBSTETRICS AND GYNECOLOGY | Facility: CLINIC | Age: 31
End: 2021-10-08

## 2021-10-08 VITALS
HEIGHT: 64 IN | WEIGHT: 159 LBS | SYSTOLIC BLOOD PRESSURE: 103 MMHG | BODY MASS INDEX: 27.14 KG/M2 | DIASTOLIC BLOOD PRESSURE: 61 MMHG

## 2021-10-08 DIAGNOSIS — R87.610 ASCUS WITH POSITIVE HIGH RISK HPV CERVICAL: Primary | ICD-10-CM

## 2021-10-08 DIAGNOSIS — R87.810 ASCUS WITH POSITIVE HIGH RISK HPV CERVICAL: Primary | ICD-10-CM

## 2021-10-08 PROCEDURE — 57455 BIOPSY OF CERVIX W/SCOPE: CPT | Performed by: OBSTETRICS & GYNECOLOGY

## 2021-10-08 NOTE — PROGRESS NOTES
Colposcopy Procedure Note    Indications: Most recent Pap smear showed: ASCUS with POSITIVE high risk HPV.  Prior cervical/vaginal disease: hx of cryo as teenager   No abnormal paps since.     Procedure Details   The risks and benefits of the procedure and Verbal informed consent obtained.    Speculum placed in vagina and excellent visualization of cervix achieved, cervix swabbed x 3 with acetic acid solution and Lugol's solution     Findings:  Cervix: no visible lesions, no mosaicism, no punctation, no abnormal vasculature and acetowhite lesion(s) noted at 6 o'clock; cervix swabbed with Lugol's solution, SCJ visualized - lesion at 6 o'clock, cervical biopsies taken at 6 o'clock, specimen labelled and sent to pathology and hemostasis achieved with Monsel's solution.  Vaginal inspection: vaginal colposcopy not performed.  Vulvar colposcopy: vulvar colposcopy not performed.    OBGyn Exam    Specimens: Cx Bx at 6:00     Procedure tolerated: well     Complications: none.    Plan:  Specimens labelled and sent to Pathology.  Will base further treatment on Pathology findings.  Post biopsy instructions given to patient.    Baljit Wiseman MD  10/8/2021  10:28 EDT

## 2021-10-12 LAB
DX ICD CODE: NORMAL
PATH REPORT.FINAL DX SPEC: NORMAL
PATH REPORT.GROSS SPEC: NORMAL
PATH REPORT.SITE OF ORIGIN SPEC: NORMAL
PATHOLOGIST NAME: NORMAL
PAYMENT PROCEDURE: NORMAL

## 2021-10-15 ENCOUNTER — TELEPHONE (OUTPATIENT)
Dept: FAMILY MEDICINE CLINIC | Age: 31
End: 2021-10-15

## 2021-10-15 NOTE — TELEPHONE ENCOUNTER
Caller: MARCI     Relationship:       Best call back number: [phone]    What form or medical record are you requesting: ALL MEDICAL RECORDS     Who is requesting this form or medical record from you: [facility]     How would you like to receive the form or medical records (pick-up, mail, fax): PATIENT WILL        Timeframe paperwork needed: ASAP     Additional notes: MARCI STATED THAT LIFE INSURANCE POLICY WILL CLOSE OUT SOON AND PATIENT HAS ALREADY DONE EXAM

## 2021-10-15 NOTE — TELEPHONE ENCOUNTER
KALEN Lamar from [facility] please advise that they will need to faxed a record of release form or have the patient come into office to sign a release form.

## 2021-10-21 RX ORDER — AMITRIPTYLINE HYDROCHLORIDE 10 MG/1
10 TABLET, FILM COATED ORAL NIGHTLY
Qty: 30 TABLET | Refills: 0 | Status: SHIPPED | OUTPATIENT
Start: 2021-10-21 | End: 2021-11-24

## 2021-11-24 RX ORDER — AMITRIPTYLINE HYDROCHLORIDE 10 MG/1
10 TABLET, FILM COATED ORAL NIGHTLY
Qty: 30 TABLET | Refills: 0 | Status: SHIPPED | OUTPATIENT
Start: 2021-11-24 | End: 2021-12-23 | Stop reason: SDUPTHER

## 2021-12-21 DIAGNOSIS — F41.9 ANXIETY: ICD-10-CM

## 2021-12-23 ENCOUNTER — OFFICE VISIT (OUTPATIENT)
Dept: FAMILY MEDICINE CLINIC | Age: 31
End: 2021-12-23

## 2021-12-23 VITALS
HEIGHT: 64 IN | SYSTOLIC BLOOD PRESSURE: 110 MMHG | DIASTOLIC BLOOD PRESSURE: 76 MMHG | BODY MASS INDEX: 28.05 KG/M2 | HEART RATE: 55 BPM | WEIGHT: 164.3 LBS

## 2021-12-23 DIAGNOSIS — G43.009 MIGRAINE WITHOUT AURA AND WITHOUT STATUS MIGRAINOSUS, NOT INTRACTABLE: ICD-10-CM

## 2021-12-23 DIAGNOSIS — F41.9 ANXIETY: Primary | ICD-10-CM

## 2021-12-23 PROCEDURE — 99213 OFFICE O/P EST LOW 20 MIN: CPT | Performed by: NURSE PRACTITIONER

## 2021-12-23 RX ORDER — AMITRIPTYLINE HYDROCHLORIDE 10 MG/1
10 TABLET, FILM COATED ORAL NIGHTLY
Qty: 90 TABLET | Refills: 1 | Status: SHIPPED | OUTPATIENT
Start: 2021-12-23 | End: 2022-06-20

## 2021-12-23 RX ORDER — BUSPIRONE HYDROCHLORIDE 5 MG/1
5 TABLET ORAL 3 TIMES DAILY PRN
Qty: 60 TABLET | Refills: 3 | Status: SHIPPED | OUTPATIENT
Start: 2021-12-23 | End: 2022-05-04

## 2021-12-23 NOTE — PROGRESS NOTES
"Chief Complaint  Follow-up and Med Refill    Subjective  Patient is a 31-year-old female here today for follow-up on migraine prevention.  Is taking amitriptyline 10 mg at bedtime.  Number of headaches per month are reduced with exception of week of her menstrual cycle which was last week.  Takes ibuprofen as needed with effectiveness at times of acute headache.  No headache is present today.    Anxiety is improved on sertraline 50 mg 1-1/2 tablet once daily.  Does still get some days of breakthrough anxiety.  This occurs very intermittently.  Denies side effects and requests refills.    Continues to see cardiology for history of PVCs and SVT and is stable on atenolol which is refilled by cardiology.        Citlalli LRAA Rodriguez presents to [facility]    Review of Systems   Constitutional: Negative.    Respiratory: Negative.    Cardiovascular: Negative.    Neurological:        No current headache.  Occurrence of migraines is stable   Psychiatric/Behavioral:        Anxiety stable most days        Objective   Vital Signs:   Vitals:    12/23/21 1047   BP: 110/76   BP Location: Left arm   Patient Position: Sitting   Pulse: 55   Weight: 74.5 kg (164 lb 4.8 oz)   Height: 161.3 cm (63.5\")      Physical Exam  Vitals reviewed.   Constitutional:       General: She is not in acute distress.     Appearance: Normal appearance. She is well-developed.   Neck:      Thyroid: No thyroid mass, thyromegaly or thyroid tenderness.   Cardiovascular:      Rate and Rhythm: Normal rate and regular rhythm.      Pulses:           Posterior tibial pulses are 2+ on the right side and 2+ on the left side.      Heart sounds: Normal heart sounds.   Pulmonary:      Effort: Pulmonary effort is normal.      Breath sounds: Normal breath sounds.   Musculoskeletal:      Right lower leg: No edema.      Left lower leg: No edema.   Skin:     General: Skin is warm and dry.   Neurological:      General: No focal deficit present.      " Mental Status: She is alert.   Psychiatric:         Attention and Perception: Attention normal.         Mood and Affect: Mood and affect normal.         Behavior: Behavior normal.          Result Review :                Assessment and Plan    Diagnoses and all orders for this visit:    1. Anxiety (Primary)  Assessment & Plan:  Options are to either increase dose of sertraline or remain on same dose of sertraline and add buspirone as needed.  Buspirone was not effective alone in the past but she had no intolerance to buspirone.  Elects to remain on same dose of sertraline and try buspirone as needed on days of breakthrough anxiety which she reports to be intermittent.  Follow-up if not improving.    Orders:  -     sertraline (ZOLOFT) 50 MG tablet; TAKE 1 AND 1/2 TABLETS BY MOUTH EVERY DAY  Dispense: 135 tablet; Refill: 1  -     busPIRone (BUSPAR) 5 MG tablet; Take 1 tablet by mouth 3 (Three) Times a Day As Needed (anxiety).  Dispense: 60 tablet; Refill: 3    2. Migraine without aura and without status migrainosus, not intractable  Assessment & Plan:  Follow-up in 6 months or sooner if headaches occur more frequently or are different in character.      Orders:  -     amitriptyline (ELAVIL) 10 MG tablet; Take 1 tablet by mouth Every Night.  Dispense: 90 tablet; Refill: 1      Follow Up    No follow-ups on file.  Patient was given instructions and counseling regarding her condition or for health maintenance advice. Please see specific information pulled into the AVS if appropriate.

## 2021-12-23 NOTE — ASSESSMENT & PLAN NOTE
Options are to either increase dose of sertraline or remain on same dose of sertraline and add buspirone as needed.  Buspirone was not effective alone in the past but she had no intolerance to buspirone.  Elects to remain on same dose of sertraline and try buspirone as needed on days of breakthrough anxiety which she reports to be intermittent.  Follow-up if not improving.

## 2021-12-23 NOTE — ASSESSMENT & PLAN NOTE
Follow-up in 6 months or sooner if headaches occur more frequently or are different in character.

## 2022-03-23 DIAGNOSIS — F41.9 ANXIETY: Primary | ICD-10-CM

## 2022-03-23 RX ORDER — BUPROPION HYDROCHLORIDE 75 MG/1
75 TABLET ORAL 2 TIMES DAILY
Qty: 60 TABLET | Refills: 5 | Status: SHIPPED | OUTPATIENT
Start: 2022-03-23 | End: 2022-05-04

## 2022-05-04 ENCOUNTER — OFFICE VISIT (OUTPATIENT)
Dept: FAMILY MEDICINE CLINIC | Age: 32
End: 2022-05-04

## 2022-05-04 VITALS
HEIGHT: 64 IN | BODY MASS INDEX: 28.13 KG/M2 | HEART RATE: 60 BPM | SYSTOLIC BLOOD PRESSURE: 123 MMHG | OXYGEN SATURATION: 100 % | WEIGHT: 164.8 LBS | DIASTOLIC BLOOD PRESSURE: 84 MMHG

## 2022-05-04 DIAGNOSIS — F41.9 ANXIETY: ICD-10-CM

## 2022-05-04 PROBLEM — R01.1 MURMUR: Status: RESOLVED | Noted: 2019-04-09 | Resolved: 2022-05-04

## 2022-05-04 PROCEDURE — 99213 OFFICE O/P EST LOW 20 MIN: CPT | Performed by: NURSE PRACTITIONER

## 2022-05-04 RX ORDER — BUSPIRONE HYDROCHLORIDE 10 MG/1
10 TABLET ORAL 3 TIMES DAILY PRN
Qty: 60 TABLET | Refills: 1 | Status: SHIPPED | OUTPATIENT
Start: 2022-05-04 | End: 2022-09-21

## 2022-05-04 RX ORDER — BUPROPION HYDROCHLORIDE 300 MG/1
300 TABLET ORAL DAILY
Qty: 90 TABLET | Refills: 0 | Status: SHIPPED | OUTPATIENT
Start: 2022-05-04 | End: 2022-08-01

## 2022-05-04 NOTE — PROGRESS NOTES
"Chief Complaint  Anxiety (Pt states Wellbutrin is not working well for anxiety. Pt takes Buspirone PRN and states it is not working as well as it did previously. )    Subjective    Patient is a 32-year-old female in today for a follow-up visit about her anxiety and depression.  Patient reports she was taking Zoloft which worked well for her, and but she did not like the side effects of fatigue and increased appetite.  Patient was recently changed to Wellbutrin, which she feels is not effective at all.  She reports that the she does like the energy that the Wellbutrin gives her, but she is having increased depression symptoms, including little or no interest in doing things.  Patient also reports increased anxiety/OCD, and the buspirone is not affecting with help and that.  Patient denies suicidal ideations or plans to harm herself at this time.          Citlalli LARA Rodriguez presents to [facility]  Anxiety  Presents for follow-up visit. Symptoms include decreased concentration, depressed mood, excessive worry, irritability, nervous/anxious behavior and obsessions. Patient reports no suicidal ideas. Symptoms occur most days. The severity of symptoms is severe. The quality of sleep is fair. Nighttime awakenings: occasional.           Objective   Vital Signs:   /84 (BP Location: Left arm, Patient Position: Sitting, Cuff Size: Adult)   Pulse 60   Ht 161.3 cm (63.5\")   Wt 74.8 kg (164 lb 12.8 oz)   SpO2 100%   BMI 28.74 kg/m²     Physical Exam  HENT:      Head: Normocephalic.   Cardiovascular:      Rate and Rhythm: Normal rate and regular rhythm.   Pulmonary:      Effort: Pulmonary effort is normal.   Skin:     General: Skin is warm and dry.   Neurological:      Mental Status: She is alert and oriented to person, place, and time.   Psychiatric:         Mood and Affect: Mood normal.         Behavior: Behavior normal.         Thought Content: Thought content normal.         Judgment: " Judgment normal.        Result Review :                 Assessment and Plan    Diagnoses and all orders for this visit:    1. Anxiety  Assessment & Plan:  Patient wants to try adjusting dose before referral to psychiatry. Follow up in two weeks with PCP if no improvement.     Orders:  -     buPROPion XL (Wellbutrin XL) 300 MG 24 hr tablet; Take 1 tablet by mouth Daily.  Dispense: 90 tablet; Refill: 0  -     busPIRone (BUSPAR) 10 MG tablet; Take 1 tablet by mouth 3 (Three) Times a Day As Needed (Anxiety).  Dispense: 60 tablet; Refill: 1             Follow Up {Instructions Charge Capture  Follow-up Communications :23}  Return in about 3 months (around 8/4/2022), or if symptoms worsen or fail to improve.  Patient was given instructions and counseling regarding her condition or for health maintenance advice. Please see specific information pulled into the AVS if appropriate.

## 2022-05-04 NOTE — ASSESSMENT & PLAN NOTE
Patient wants to try adjusting dose before referral to psychiatry. Follow up in two weeks with PCP if no improvement.

## 2022-06-18 DIAGNOSIS — G43.009 MIGRAINE WITHOUT AURA AND WITHOUT STATUS MIGRAINOSUS, NOT INTRACTABLE: ICD-10-CM

## 2022-06-20 RX ORDER — AMITRIPTYLINE HYDROCHLORIDE 10 MG/1
10 TABLET, FILM COATED ORAL NIGHTLY
Qty: 90 TABLET | Refills: 0 | Status: SHIPPED | OUTPATIENT
Start: 2022-06-20 | End: 2022-09-21

## 2022-07-30 DIAGNOSIS — F41.9 ANXIETY: ICD-10-CM

## 2022-08-01 RX ORDER — BUPROPION HYDROCHLORIDE 300 MG/1
300 TABLET ORAL DAILY
Qty: 90 TABLET | Refills: 0 | Status: SHIPPED | OUTPATIENT
Start: 2022-08-01 | End: 2022-10-11

## 2022-08-18 RX ORDER — NORGESTIMATE AND ETHINYL ESTRADIOL 0.25-0.035
KIT ORAL
Qty: 84 TABLET | Refills: 0 | Status: SHIPPED | OUTPATIENT
Start: 2022-08-18 | End: 2022-09-21

## 2022-08-18 NOTE — TELEPHONE ENCOUNTER
Left message for Citlalli that her AE will be due after 9/14/21 with Dr Wiseman. Please call [phone] to schedule your appt. Thank you.

## 2022-09-12 ENCOUNTER — TELEPHONE (OUTPATIENT)
Dept: OBSTETRICS AND GYNECOLOGY | Facility: CLINIC | Age: 32
End: 2022-09-12

## 2022-09-12 RX ORDER — FLUCONAZOLE 150 MG/1
150 TABLET ORAL ONCE
Qty: 1 TABLET | Refills: 2 | Status: SHIPPED | OUTPATIENT
Start: 2022-09-12 | End: 2023-03-15

## 2022-09-12 NOTE — TELEPHONE ENCOUNTER
----- Message from Sharyn Rawls MA sent at 9/12/2022 10:30 AM EDT -----  Regarding: FW: Yeast infection medication     ----- Message -----  From: Citlalli Rodriguez  Sent: 9/12/2022  10:13 AM EDT  To: [facility]  Subject: Yeast infection medication                       Hello I was wondering if you could send in a prescription for a yeast infection instead of using over the counter products.

## 2022-09-13 DIAGNOSIS — N76.0 ACUTE VAGINITIS: Primary | ICD-10-CM

## 2022-09-13 RX ORDER — FLUCONAZOLE 150 MG/1
150 TABLET ORAL ONCE
Qty: 1 TABLET | Refills: 0 | Status: SHIPPED | OUTPATIENT
Start: 2022-09-13 | End: 2022-09-13

## 2022-09-14 ENCOUNTER — TELEPHONE (OUTPATIENT)
Dept: OBSTETRICS AND GYNECOLOGY | Facility: CLINIC | Age: 32
End: 2022-09-14

## 2022-09-14 NOTE — TELEPHONE ENCOUNTER
Per HUB, pt requesting to speak with MA about some concerns she is having.  Please call her back at your next convenience.     Pt # [phone]

## 2022-09-14 NOTE — TELEPHONE ENCOUNTER
Spoke to pt, she is still having itching, white d/c and an odor after taking Diflucan on Monday this week. I advised pt to give a few days to see if gets better and if not better by Friday, we will see her to ensure we are treating the right infection.     Sharyn

## 2022-09-21 ENCOUNTER — TELEMEDICINE (OUTPATIENT)
Dept: FAMILY MEDICINE CLINIC | Facility: TELEHEALTH | Age: 32
End: 2022-09-21

## 2022-09-21 DIAGNOSIS — J01.90 ACUTE NON-RECURRENT SINUSITIS, UNSPECIFIED LOCATION: Primary | ICD-10-CM

## 2022-09-21 PROCEDURE — 99213 OFFICE O/P EST LOW 20 MIN: CPT | Performed by: NURSE PRACTITIONER

## 2022-09-21 RX ORDER — FLUTICASONE PROPIONATE 50 MCG
2 SPRAY, SUSPENSION (ML) NASAL DAILY
Qty: 16 ML | Refills: 0 | Status: SHIPPED | OUTPATIENT
Start: 2022-09-21 | End: 2022-11-08

## 2022-09-21 NOTE — PROGRESS NOTES
Subjective   Chief Complaint   Patient presents with   • Sinus Problem       Citlalli Rodriguez is a 32 y.o. female.     History of Present Illness  Patient reports 2 days of nasal congestion and sinus pressure.  She reports green nasal drainage.  She states she gets a sinus infection every year around this time.  She has tried Mucinex with no relief.  Denies sick contacts.  She has not taken a COVID test.  Sinus Problem  This is a new problem. Episode onset: 2 days. The problem is unchanged. There has been no fever. Associated symptoms include congestion and sinus pressure. Pertinent negatives include no chills, coughing, diaphoresis, ear pain, neck pain, shortness of breath, sneezing, sore throat or swollen glands. Treatments tried: mucinex. The treatment provided no relief.        Allergies   Allergen Reactions   • Penicillins Itching and Rash   • Promethazine Itching, Rash and Myalgia   • Promethazine Hcl Nausea And Vomiting and Rash   • Zomig [Zolmitriptan] Nausea Only       Past Medical History:   Diagnosis Date   • Abnormal ECG    • Abnormal Pap smear of cervix    • Anxiety    • Chlamydia    • HPV (human papilloma virus) infection    • Migraine    • Ovarian cyst    • Rh incompatibility    • SVT (supraventricular tachycardia) (HCC)    • Varicella        Past Surgical History:   Procedure Laterality Date   • GYNECOLOGIC CRYOSURGERY     • WISDOM TOOTH EXTRACTION         Social History     Socioeconomic History   • Marital status:    Tobacco Use   • Smoking status: Never Smoker   • Smokeless tobacco: Never Used   Vaping Use   • Vaping Use: Never used   Substance and Sexual Activity   • Alcohol use: No   • Drug use: No   • Sexual activity: Yes     Partners: Male     Birth control/protection: Pill       Family History   Problem Relation Age of Onset   • No Known Problems Father    • No Known Problems Mother    • Breast cancer Neg Hx    • Ovarian cancer Neg Hx    • Uterine cancer Neg Hx    • Colon cancer Neg Hx     • Deep vein thrombosis Neg Hx    • Pulmonary embolism Neg Hx          Current Outpatient Medications:   •  atenolol (TENORMIN) 50 MG tablet, , Disp: , Rfl:   •  buPROPion XL (WELLBUTRIN XL) 300 MG 24 hr tablet, TAKE 1 TABLET BY MOUTH DAILY, Disp: 90 tablet, Rfl: 0  •  fluticasone (Flonase) 50 MCG/ACT nasal spray, 2 sprays into the nostril(s) as directed by provider Daily., Disp: 16 mL, Rfl: 0      Review of Systems   Constitutional: Negative for chills, diaphoresis, fatigue and fever.   HENT: Positive for congestion, postnasal drip and sinus pressure. Negative for ear pain, sneezing, sore throat and swollen glands.    Respiratory: Negative for cough, chest tightness, shortness of breath and wheezing.    Cardiovascular: Negative.    Gastrointestinal: Negative.    Musculoskeletal: Negative for myalgias and neck pain.   Neurological: Negative for headache.        There were no vitals filed for this visit.    Objective   Physical Exam  Constitutional:       General: She is not in acute distress.     Appearance: Normal appearance. She is not ill-appearing, toxic-appearing or diaphoretic.   HENT:      Head: Normocephalic.      Nose: Congestion present.      Right Sinus: Maxillary sinus tenderness and frontal sinus tenderness present.      Left Sinus: Maxillary sinus tenderness and frontal sinus tenderness present.      Comments: Per pt       Mouth/Throat:      Lips: Pink.      Mouth: Mucous membranes are moist.   Pulmonary:      Effort: Pulmonary effort is normal.   Neurological:      Mental Status: She is alert and oriented to person, place, and time.   Psychiatric:         Mood and Affect: Mood normal.         Behavior: Behavior normal.          Procedures     Assessment & Plan   Diagnoses and all orders for this visit:    1. Acute non-recurrent sinusitis, unspecified location (Primary)  -     fluticasone (Flonase) 50 MCG/ACT nasal spray; 2 sprays into the nostril(s) as directed by provider Daily.  Dispense: 16 mL;  Refill: 0    Sinusitis requiring antibiotics is generally diagnosed in the following situations:  • No symptom improvement after 10 days  • Onset of high fever and purulent nasal drainage, or facial pain for more than 3-4 days  • Worsening symptoms following a viral upper respiratory virus that was initially improving  Because you report having symptoms for only 2 days and no fever it is unlikely that you need an antibiotic at this time. Majority of cases are viral or allergy related and do not require antibiotics at all.      May continue to take Mucinex.  Continue Flonase once or twice daily consistently over the next several days.  If symptoms do not improve and you continue to have sinus pressure and green nasal drainage please message me through [employer] to discuss antibiotics at that time.    If symptoms worsen or do not improve follow up with your PCP or visit your nearest Urgent Care Center or ER.        PLAN: Discussed dosing, side effects, recommended other symptomatic care.  Patient should follow up with primary care provider, Urgent Care or ER if symptoms worsen, fail to resolve or other symptoms need attention. Patient/family agree to the above.         ARELI Benson     The use of a video visit has been reviewed with the patient and verbal informed consent has been obtained. Myself and Citlalli Rodriguez participated in this visit. The patient is located at [address] [place] [address]   [place] [address]. I am located in [place], [place]. [employer] and [employer] were utilized.        This visit was performed via Telehealth.  This patient has been instructed to follow-up with their primary care provider if their symptoms worsen or the treatment provided does not resolve their illness.

## 2022-09-21 NOTE — PATIENT INSTRUCTIONS
Sinusitis requiring antibiotics is generally diagnosed in the following situations:  No symptom improvement after 10 days  Onset of high fever and purulent nasal drainage, or facial pain for more than 3-4 days  Worsening symptoms following a viral upper respiratory virus that was initially improving  Because you report having symptoms for only 2 days and no fever it is unlikely that you need an antibiotic at this time. Majority of cases are viral or allergy related and do not require antibiotics at all.      May continue to take Mucinex.  Continue Flonase once or twice daily consistently over the next several days.  If symptoms do not improve and you continue to have sinus pressure and green nasal drainage please message me through [employer] to discuss antibiotics at that time.    If symptoms worsen or do not improve follow up with your PCP or visit your nearest Urgent Care Center or ER.

## 2022-10-06 RX ORDER — AZITHROMYCIN 250 MG/1
TABLET, FILM COATED ORAL
Qty: 6 TABLET | Refills: 0 | Status: SHIPPED | OUTPATIENT
Start: 2022-10-06 | End: 2022-11-08

## 2022-10-11 ENCOUNTER — OFFICE VISIT (OUTPATIENT)
Dept: OBSTETRICS AND GYNECOLOGY | Facility: CLINIC | Age: 32
End: 2022-10-11

## 2022-10-11 VITALS
HEART RATE: 65 BPM | DIASTOLIC BLOOD PRESSURE: 74 MMHG | BODY MASS INDEX: 26.8 KG/M2 | SYSTOLIC BLOOD PRESSURE: 116 MMHG | HEIGHT: 64 IN | WEIGHT: 157 LBS

## 2022-10-11 DIAGNOSIS — R87.610 ASCUS WITH POSITIVE HIGH RISK HPV CERVICAL: ICD-10-CM

## 2022-10-11 DIAGNOSIS — R87.810 ASCUS WITH POSITIVE HIGH RISK HPV CERVICAL: ICD-10-CM

## 2022-10-11 DIAGNOSIS — Z01.419 ENCOUNTER FOR GYNECOLOGICAL EXAMINATION WITHOUT ABNORMAL FINDING: Primary | ICD-10-CM

## 2022-10-11 PROCEDURE — 99395 PREV VISIT EST AGE 18-39: CPT | Performed by: OBSTETRICS & GYNECOLOGY

## 2022-10-11 NOTE — PROGRESS NOTES
GYN Annual Exam     CC- Here for annual exam.     Citlalli Rodriguez is a 32 y.o. female who presents for annual well woman exam. Periods are regular every 28-30 days, lasting 4 days. Dysmenorrhea:mild, occurring premenstrually. Cyclic symptoms include none. No intermenstrual bleeding, spotting, or discharge.    OB History        3    Para   2    Term   2            AB   1    Living   2       SAB   1    IAB        Ectopic        Molar        Multiple   0    Live Births   2                Current contraception: vasectomy  History of abnormal Pap smear: yes - Cryo in the past  Family history of uterine, colon or ovarian cancer: no  History of abnormal mammogram: no  Family history of breast cancer: no  Last Pap : 2021, ASCUS HPV pos, neg 16/18/45, Colpo BX WNL     Past Medical History:   Diagnosis Date   • Abnormal ECG    • Abnormal Pap smear of cervix    • Anxiety    • Chlamydia    • HPV (human papilloma virus) infection    • Migraine    • Ovarian cyst    • Rh incompatibility    • SVT (supraventricular tachycardia) (HCC)    • Varicella        Past Surgical History:   Procedure Laterality Date   • GYNECOLOGIC CRYOSURGERY     • WISDOM TOOTH EXTRACTION           Current Outpatient Medications:   •  azithromycin (Zithromax Z-Luis) 250 MG tablet, Take 2 tablets by mouth on day 1, then 1 tablet daily on days 2-5, Disp: 6 tablet, Rfl: 0  •  fluticasone (Flonase) 50 MCG/ACT nasal spray, 2 sprays into the nostril(s) as directed by provider Daily., Disp: 16 mL, Rfl: 0    Allergies   Allergen Reactions   • Penicillins Itching and Rash   • Promethazine Itching, Rash and Myalgia   • Promethazine Hcl Nausea And Vomiting and Rash   • Zomig [Zolmitriptan] Nausea Only       Social History     Tobacco Use   • Smoking status: Never   • Smokeless tobacco: Never   Vaping Use   • Vaping Use: Never used   Substance Use Topics   • Alcohol use: Never   • Drug use: Never       Family History   Problem Relation Age of Onset   • No  "Known Problems Father    • No Known Problems Mother    • Breast cancer Neg Hx    • Ovarian cancer Neg Hx    • Uterine cancer Neg Hx    • Colon cancer Neg Hx    • Deep vein thrombosis Neg Hx    • Pulmonary embolism Neg Hx        Review of Systems   Constitutional: Negative for chills and fever.   Gastrointestinal: Negative for abdominal pain, constipation and diarrhea.   Genitourinary: Negative for menstrual problem, pelvic pain, vaginal bleeding and vaginal discharge.   All other systems reviewed and are negative.      /74   Pulse 65   Ht 161.3 cm (63.5\")   Wt 71.2 kg (157 lb)   LMP 09/28/2022 (Exact Date)   BMI 27.38 kg/m²     Physical Exam  Constitutional:       General: She is not in acute distress.     Appearance: She is well-developed and normal weight.   Genitourinary:      Vulva normal.      Right Labia: No lesions or Bartholin's cyst.     Left Labia: No lesions or Bartholin's cyst.     No inguinal adenopathy present in the right or left side.     No vaginal discharge or bleeding.        Right Adnexa: not tender, not full and no mass present.     Left Adnexa: not tender, not full and no mass present.     No cervical motion tenderness or friability.      Uterus is not enlarged or tender.      No uterine mass detected.     Uterus is anteverted.   Breasts:     Right: Breast implant present. No inverted nipple, mass or nipple discharge.      Left: Breast implant present. No inverted nipple, mass or nipple discharge.   HENT:      Head: Normocephalic and atraumatic.      Nose: Nose normal.   Eyes:      Conjunctiva/sclera: Conjunctivae normal.      Pupils: Pupils are equal, round, and reactive to light.   Neck:      Thyroid: No thyromegaly.   Cardiovascular:      Rate and Rhythm: Normal rate and regular rhythm.      Heart sounds: Normal heart sounds. No murmur heard.  Pulmonary:      Effort: Pulmonary effort is normal. No respiratory distress.      Breath sounds: Normal breath sounds.   Abdominal:      " General: Abdomen is flat. There is no distension.      Palpations: Abdomen is soft.      Tenderness: There is no abdominal tenderness.   Musculoskeletal:         General: No deformity. Normal range of motion.      Cervical back: Normal range of motion and neck supple.      Right lower leg: No edema.      Left lower leg: No edema.   Lymphadenopathy:      Lower Body: No right inguinal adenopathy. No left inguinal adenopathy.   Neurological:      Mental Status: She is alert and oriented to person, place, and time.   Skin:     General: Skin is warm and dry.      Findings: No erythema.   Psychiatric:         Behavior: Behavior normal.         Thought Content: Thought content normal.         Judgment: Judgment normal.   Vitals reviewed. Exam conducted with a chaperone present.               Assessment     Diagnoses and all orders for this visit:    1. Encounter for gynecological examination without abnormal finding (Primary)    2. ASCUS with positive high risk HPV cervical  -     IGP, Apt HPV,rfx 16 / 18,45    1) GYN exam   Expectations reviewed.     2) ASCUS HPV +   Negative colpo   Repeating testing today      Plan     1) Breast Health - Clinical breast exam yearly, Discussed American cancer society recommendations for breast cancer screening, and Self breast awareness monthly  2) Pap - updated today - expectations reviewed.   3) Smoking status- non-smoker   4) Encouraged to be wary of information obtained via social media and internet based on source and search.  5) Follow up prn and one year.       Baljit Wiseman MD   10/11/2022  13:12 EDT

## 2022-10-18 LAB
CYTOLOGIST CVX/VAG CYTO: ABNORMAL
CYTOLOGY CVX/VAG DOC CYTO: ABNORMAL
CYTOLOGY CVX/VAG DOC THIN PREP: ABNORMAL
DX ICD CODE: ABNORMAL
DX ICD CODE: ABNORMAL
HIV 1 & 2 AB SER-IMP: ABNORMAL
HPV I/H RISK 4 DNA CVX QL PROBE+SIG AMP: NEGATIVE
OTHER STN SPEC: ABNORMAL
PATHOLOGIST CVX/VAG CYTO: ABNORMAL
SPECIMEN STATUS: NORMAL
STAT OF ADQ CVX/VAG CYTO-IMP: ABNORMAL

## 2022-10-19 NOTE — PROGRESS NOTES
Sharyn, ASCUS HPV negative, last year ASCUS HPV + - per [facility] - still need to do colpo on this patient. I left message to call the office. Thanks, Dr. Wiseman

## 2022-10-20 ENCOUNTER — TELEPHONE (OUTPATIENT)
Dept: OBSTETRICS AND GYNECOLOGY | Facility: CLINIC | Age: 32
End: 2022-10-20

## 2022-10-20 NOTE — TELEPHONE ENCOUNTER
----- Message from Baljit Wiseman MD sent at 10/19/2022  4:47 PM EDT -----  Sharyn, ASCUS HPV negative, last year ASCUS HPV + - per [facility] - still need to do colpo on this patient. I left message to call the office. Thanks, Dr. Wiseman

## 2022-10-21 ENCOUNTER — TELEPHONE (OUTPATIENT)
Dept: OBSTETRICS AND GYNECOLOGY | Facility: CLINIC | Age: 32
End: 2022-10-21

## 2022-10-21 NOTE — TELEPHONE ENCOUNTER
----- Message from Baljit Wiseman MD sent at 10/20/2022  5:07 PM EDT -----  Sharyn, per note earlier. She is aware and scheduled for colpo. Thanks, Dr. Wiseman

## 2022-11-01 DIAGNOSIS — F41.9 ANXIETY: ICD-10-CM

## 2022-11-01 RX ORDER — BUPROPION HYDROCHLORIDE 300 MG/1
300 TABLET ORAL DAILY
Qty: 90 TABLET | Refills: 0 | Status: SHIPPED | OUTPATIENT
Start: 2022-11-01 | End: 2023-01-25

## 2022-11-08 ENCOUNTER — PROCEDURE VISIT (OUTPATIENT)
Dept: OBSTETRICS AND GYNECOLOGY | Facility: CLINIC | Age: 32
End: 2022-11-08

## 2022-11-08 ENCOUNTER — TELEPHONE (OUTPATIENT)
Dept: OBSTETRICS AND GYNECOLOGY | Facility: CLINIC | Age: 32
End: 2022-11-08

## 2022-11-08 VITALS
BODY MASS INDEX: 26.63 KG/M2 | SYSTOLIC BLOOD PRESSURE: 142 MMHG | DIASTOLIC BLOOD PRESSURE: 89 MMHG | HEART RATE: 58 BPM | WEIGHT: 156 LBS | HEIGHT: 64 IN

## 2022-11-08 DIAGNOSIS — R87.610 ASCUS OF CERVIX WITH NEGATIVE HIGH RISK HPV: Primary | ICD-10-CM

## 2022-11-08 PROCEDURE — 57456 ENDOCERV CURETTAGE W/SCOPE: CPT | Performed by: OBSTETRICS & GYNECOLOGY

## 2022-11-08 RX ORDER — ATENOLOL 50 MG/1
50 TABLET ORAL DAILY
COMMUNITY
Start: 2022-08-30

## 2022-11-08 NOTE — PROGRESS NOTES
"  Colposcopy Procedure Note    Indications: Most recent Pap smear showed: atypical squamous cellularity of undetermined significance (ASCUS). HPV negative   Prior cervical/vaginal disease: HPV related changes.  Prior cervical treatment: no treatment.    2/16/2017 _ NIL  9/14/2021 - ASCUS, HPV +   Colpo with 'focal atypical squamous metaplasia\"  10/11/22 - ASCUS HPV negative  Here today for evaluation     Procedure Details   The risks and benefits of the procedure and Verbal informed consent obtained.    Speculum placed in vagina and excellent visualization of cervix achieved, cervix swabbed x 3 with acetic acid solution and Lugol's solution     Findings:  Cervix: no visible lesions, no mosaicism, no punctation and no abnormal vasculature; cervix swabbed with Lugol's solution, endocervical curettage performed, specimen labelled and sent to pathology and hemostasis achieved with Monsel's solution. T zone normal overall. No significant changes.   Vaginal inspection: vaginal colposcopy not performed.  Vulvar colposcopy: vulvar colposcopy not performed.    OBGyn Exam    Specimens: ECC     Procedure tolerated: well    Complications: none.    Plan:  Specimens labelled and sent to Pathology.  Will base further treatment on Pathology findings.  Post biopsy instructions given to patient.    Baljit Wiseman MD  11/8/2022  12:43 EST      "

## 2022-11-10 LAB
DX ICD CODE: NORMAL
DX ICD CODE: NORMAL
PATH REPORT.FINAL DX SPEC: NORMAL
PATH REPORT.GROSS SPEC: NORMAL
PATH REPORT.SITE OF ORIGIN SPEC: NORMAL
PATHOLOGIST NAME: NORMAL
PAYMENT PROCEDURE: NORMAL

## 2022-11-10 NOTE — PROGRESS NOTES
Sharyn, ECC is negative. So pap in 12 months. Please let her know and put on for recall. Thanks, Dr. Wiseman

## 2022-11-14 ENCOUNTER — TELEPHONE (OUTPATIENT)
Dept: OBSTETRICS AND GYNECOLOGY | Facility: CLINIC | Age: 32
End: 2022-11-14

## 2022-11-14 NOTE — TELEPHONE ENCOUNTER
----- Message from Sharyn Rawls MA sent at 11/11/2022  2:55 PM EST -----  L/m for pt/marlon  ----- Message -----  From: Baljit Wiseman MD  Sent: 11/10/2022   4:50 PM EST  To: TIFFANY Grimaldo ECC is negative. So pap in 12 months. Please let her know and put on for recall. Thanks, Dr. Wiseman

## 2023-01-01 ENCOUNTER — TELEMEDICINE (OUTPATIENT)
Dept: FAMILY MEDICINE CLINIC | Facility: TELEHEALTH | Age: 33
End: 2023-01-01
Payer: MEDICAID

## 2023-01-01 DIAGNOSIS — J01.00 ACUTE MAXILLARY SINUSITIS, RECURRENCE NOT SPECIFIED: Primary | ICD-10-CM

## 2023-01-01 PROCEDURE — 99213 OFFICE O/P EST LOW 20 MIN: CPT | Performed by: NURSE PRACTITIONER

## 2023-01-01 RX ORDER — DOXYCYCLINE HYCLATE 100 MG/1
100 CAPSULE ORAL 2 TIMES DAILY
Qty: 20 CAPSULE | Refills: 0 | Status: SHIPPED | OUTPATIENT
Start: 2023-01-01 | End: 2023-01-11

## 2023-01-01 NOTE — PATIENT INSTRUCTIONS
Drink plenty of water  Over the counter pain relievers okay   If symptoms do not improve in 3-5 days follow up with your primary care provider or urgent care    Sinusitis, Adult  Sinusitis is soreness and swelling (inflammation) of your sinuses. Sinuses are hollow spaces in the bones around your face. They are located:  Around your eyes.  In the middle of your forehead.  Behind your nose.  In your cheekbones.  Your sinuses and nasal passages are lined with a fluid called mucus. Mucus drains out of your sinuses. Swelling can trap mucus in your sinuses. This lets germs (bacteria, virus, or fungus) grow, which leads to infection. Most of the time, this condition is caused by a virus.  What are the causes?  This condition is caused by:  Allergies.  Asthma.  Germs.  Things that block your nose or sinuses.  Growths in the nose (nasal polyps).  Chemicals or irritants in the air.  Fungus (rare).  What increases the risk?  You are more likely to develop this condition if:  You have a weak body defense system (immune system).  You do a lot of swimming or diving.  You use nasal sprays too much.  You smoke.  What are the signs or symptoms?  The main symptoms of this condition are pain and a feeling of pressure around the sinuses. Other symptoms include:  Stuffy nose (congestion).  Runny nose (drainage).  Swelling and warmth in the sinuses.  Headache.  Toothache.  A cough that may get worse at night.  Mucus that collects in the throat or the back of the nose (postnasal drip).  Being unable to smell and taste.  Being very tired (fatigue).  A fever.  Sore throat.  Bad breath.  How is this diagnosed?  This condition is diagnosed based on:  Your symptoms.  Your medical history.  A physical exam.  Tests to find out if your condition is short-term (acute) or long-term (chronic). Your doctor may:  Check your nose for growths (polyps).  Check your sinuses using a tool that has a light (endoscope).  Check for allergies or germs.  Do  imaging tests, such as an MRI or CT scan.  How is this treated?  Treatment for this condition depends on the cause and whether it is short-term or long-term.  If caused by a virus, your symptoms should go away on their own within 10 days. You may be given medicines to relieve symptoms. They include:  Medicines that shrink swollen tissue in the nose.  Medicines that treat allergies (antihistamines).  A spray that treats swelling of the nostrils.   Rinses that help get rid of thick mucus in your nose (nasal saline washes).  If caused by bacteria, your doctor may wait to see if you will get better without treatment. You may be given antibiotic medicine if you have:  A very bad infection.  A weak body defense system.  If caused by growths in the nose, you may need to have surgery.  Follow these instructions at home:  Medicines  Take, use, or apply over-the-counter and prescription medicines only as told by your doctor. These may include nasal sprays.  If you were prescribed an antibiotic medicine, take it as told by your doctor. Do not stop taking the antibiotic even if you start to feel better.  Hydrate and humidify    Drink enough water to keep your pee (urine) pale yellow.  Use a cool mist humidifier to keep the humidity level in your home above 50%.  Breathe in steam for 10-15 minutes, 3-4 times a day, or as told by your doctor. You can do this in the bathroom while a hot shower is running.  Try not to spend time in cool or dry air.  Rest  Rest as much as you can.  Sleep with your head raised (elevated).  Make sure you get enough sleep each night.  General instructions    Put a warm, moist washcloth on your face 3-4 times a day, or as often as told by your doctor. This will help with discomfort.  Wash your hands often with soap and water. If there is no soap and water, use hand .  Do not smoke. Avoid being around people who are smoking (secondhand smoke).  Keep all follow-up visits as told by your doctor.  This is important.  Contact a doctor if:  You have a fever.  Your symptoms get worse.  Your symptoms do not get better within 10 days.  Get help right away if:  You have a very bad headache.  You cannot stop throwing up (vomiting).  You have very bad pain or swelling around your face or eyes.  You have trouble seeing.  You feel confused.  Your neck is stiff.  You have trouble breathing.  Summary  Sinusitis is swelling of your sinuses. Sinuses are hollow spaces in the bones around your face.  This condition is caused by tissues in your nose that become inflamed or swollen. This traps germs. These can lead to infection.  If you were prescribed an antibiotic medicine, take it as told by your doctor. Do not stop taking it even if you start to feel better.  Keep all follow-up visits as told by your doctor. This is important.  This information is not intended to replace advice given to you by your health care provider. Make sure you discuss any questions you have with your health care provider.  Document Revised: 05/20/2019 Document Reviewed: 05/20/2019  [employer] Patient Education © 2022 [employer]

## 2023-01-01 NOTE — PROGRESS NOTES
CHIEF COMPLAINT  Chief Complaint   Patient presents with   • Sinusitis         HPI  Citlalli Rodriguez is a 32 y.o. female  presents with complaint of sinus infection with symptoms starting 2 weeks ago. This week mucus has gotten very thick and green and now having pain in cheeks.     Review of Systems   Constitutional: Negative for chills, diaphoresis, fatigue and fever.   HENT: Positive for postnasal drip, sinus pressure and sinus pain.    Respiratory: Negative for cough.    Gastrointestinal: Negative for diarrhea, nausea and vomiting.   Neurological: Positive for headaches.       Past Medical History:   Diagnosis Date   • Abnormal ECG    • Abnormal Pap smear of cervix    • Anxiety    • Chlamydia    • HPV (human papilloma virus) infection    • Migraine    • Ovarian cyst    • Rh incompatibility    • SVT (supraventricular tachycardia) (HCC)    • Varicella        Family History   Problem Relation Age of Onset   • No Known Problems Father    • No Known Problems Mother    • Breast cancer Neg Hx    • Ovarian cancer Neg Hx    • Uterine cancer Neg Hx    • Colon cancer Neg Hx    • Deep vein thrombosis Neg Hx    • Pulmonary embolism Neg Hx        Social History     Socioeconomic History   • Marital status:    Tobacco Use   • Smoking status: Never   • Smokeless tobacco: Never   Vaping Use   • Vaping Use: Never used   Substance and Sexual Activity   • Alcohol use: Never   • Drug use: Never   • Sexual activity: Yes     Partners: Male     Birth control/protection: Vasectomy       Citlalli Rodriguez  reports that she has never smoked. She has never used smokeless tobacco..    Breastfeeding No Comment:  had vasectomy    PHYSICAL EXAM  Physical Exam   Constitutional: She is oriented to person, place, and time. She appears well-developed and well-nourished. She does not have a sickly appearance. She does not appear ill. No distress.   HENT:   Head: Normocephalic and atraumatic.   Nose: Right sinus exhibits maxillary sinus  tenderness (pt report). Left sinus exhibits maxillary sinus tenderness (pt report).   Eyes: EOM are normal.   Neck: Neck normal appearance.  Pulmonary/Chest: Effort normal.  No respiratory distress.  Neurological: She is alert and oriented to person, place, and time.   Skin: Skin is dry.   Psychiatric: She has a normal mood and affect.       Diagnoses and all orders for this visit:    1. Acute maxillary sinusitis, recurrence not specified (Primary)    Other orders  -     doxycycline (VIBRAMYCIN) 100 MG capsule; Take 1 capsule by mouth 2 (Two) Times a Day for 10 days.  Dispense: 20 capsule; Refill: 0        The use of a video visit has been reviewed with the patient and verbal informed consent has been obtained. Myself and Citlalli Rodriguez participated in this visit. The patient is located in [address] [place] [address]  [place] [address]. I am located in [place], [place]. [employer] and [employer] were utilized.       Note Disclaimer: At [facility], we believe that sharing information builds trust and better   relationships. You are receiving this note because you recently visited [facility]. It is possible you   will see health information before a provider has talked with you about it. This kind of information can   be easy to misunderstand. To help you fully understand what it means for your health, we urge you to   discuss this note with your provider.    ARELI Rivera  01/01/2023  09:37 EST

## 2023-01-01 NOTE — TELEPHONE ENCOUNTER
Problem: Potential for Hypothermia Related to Thermoregulation  Goal: [place] will maintain body temperature between 97.6 degrees axillary F and 99.6 degrees axillary F in an open crib  Outcome: Progressing     Problem: Potential for Alteration Related to Poor Oral Intake or  Complications  Goal: [place] will maintain 90% of birthweight and optimal level of hydration  Outcome: Progressing   The patient is Stable - Low risk of patient condition declining or worsening    Shift Goals: maintaining stable temperature  Clinical Goals: maintain stable vital signs    Progress made toward(s) clinical / shift goals:  clinically stable    Patient is not progressing towards the following goals:       Pt given an appointment for Colposcopy.    Dr. Chavez

## 2023-01-25 DIAGNOSIS — F41.9 ANXIETY: ICD-10-CM

## 2023-01-25 RX ORDER — BUPROPION HYDROCHLORIDE 300 MG/1
300 TABLET ORAL DAILY
Qty: 30 TABLET | Refills: 0 | Status: SHIPPED | OUTPATIENT
Start: 2023-01-25 | End: 2023-02-08 | Stop reason: SDUPTHER

## 2023-02-08 ENCOUNTER — OFFICE VISIT (OUTPATIENT)
Dept: FAMILY MEDICINE CLINIC | Age: 33
End: 2023-02-08
Payer: COMMERCIAL

## 2023-02-08 VITALS
DIASTOLIC BLOOD PRESSURE: 78 MMHG | SYSTOLIC BLOOD PRESSURE: 112 MMHG | BODY MASS INDEX: 24.75 KG/M2 | OXYGEN SATURATION: 99 % | WEIGHT: 145 LBS | HEART RATE: 63 BPM | HEIGHT: 64 IN

## 2023-02-08 DIAGNOSIS — F41.9 ANXIETY: ICD-10-CM

## 2023-02-08 PROCEDURE — 99213 OFFICE O/P EST LOW 20 MIN: CPT | Performed by: NURSE PRACTITIONER

## 2023-02-08 RX ORDER — ESCITALOPRAM OXALATE 5 MG/1
5 TABLET ORAL DAILY
Qty: 30 TABLET | Refills: 11 | Status: SHIPPED | OUTPATIENT
Start: 2023-02-08

## 2023-02-08 RX ORDER — BUPROPION HYDROCHLORIDE 300 MG/1
300 TABLET ORAL DAILY
Qty: 90 TABLET | Refills: 3 | Status: SHIPPED | OUTPATIENT
Start: 2023-02-08

## 2023-02-08 NOTE — ASSESSMENT & PLAN NOTE
Add low-dose Lexapro to current Wellbutrin dosage.  Follow-up if symptoms are not improving within 3 to 4 weeks.

## 2023-02-08 NOTE — PROGRESS NOTES
"Chief Complaint  Anxiety (Follow up - med refills)    Subjective          Citlalli Rodriguez presents to [facility]    Patient is a 33-year-old female who is here today to follow-up regarding anxiety.  Wellbutrin 300 mg daily is helpful for anxiety.  Denies depression.  No concerns with weight gain on this particular medication.  Does still get some breakthrough anxiety.  Buspirone was not effective and patient denies insomnia.    lmp jan 13  Cardiology [facility] prescribes atenolol and symptoms stable.        Objective   Vital Signs:   Vitals:    02/08/23 0855   BP: 112/78   BP Location: Left arm   Patient Position: Sitting   Cuff Size: Adult   Pulse: 63   SpO2: 99%   Weight: 65.8 kg (145 lb)   Height: 161.3 cm (63.5\")      Body mass index is 25.28 kg/m².  Physical Exam  Vitals reviewed.   Constitutional:       General: She is not in acute distress.     Appearance: Normal appearance. She is well-developed.   Neck:      Thyroid: No thyroid mass, thyromegaly or thyroid tenderness.   Cardiovascular:      Rate and Rhythm: Normal rate and regular rhythm.      Heart sounds: Normal heart sounds.   Pulmonary:      Effort: Pulmonary effort is normal.      Breath sounds: Normal breath sounds.   Musculoskeletal:      Right lower leg: No edema.      Left lower leg: No edema.   Skin:     General: Skin is warm and dry.   Neurological:      General: No focal deficit present.      Mental Status: She is alert.   Psychiatric:         Attention and Perception: Attention normal.         Mood and Affect: Mood and affect normal.         Behavior: Behavior normal.           Current Outpatient Medications:   •  atenolol (TENORMIN) 50 MG tablet, Take 50 mg by mouth Daily., Disp: , Rfl:   •  buPROPion XL (WELLBUTRIN XL) 300 MG 24 hr tablet, Take 1 tablet by mouth Daily., Disp: 90 tablet, Rfl: 3  •  escitalopram (Lexapro) 5 MG tablet, Take 1 tablet by mouth Daily., Disp: 30 tablet, Rfl: 11       Result " Review :                                Assessment and Plan    Diagnoses and all orders for this visit:    1. Anxiety  Assessment & Plan:  Add low-dose Lexapro to current Wellbutrin dosage.  Follow-up if symptoms are not improving within 3 to 4 weeks.    Orders:  -     buPROPion XL (WELLBUTRIN XL) 300 MG 24 hr tablet; Take 1 tablet by mouth Daily.  Dispense: 90 tablet; Refill: 3  -     escitalopram (Lexapro) 5 MG tablet; Take 1 tablet by mouth Daily.  Dispense: 30 tablet; Refill: 11      Follow Up    No follow-ups on file.  Patient was given instructions and counseling regarding her condition or for health maintenance advice. Please see specific information pulled into the AVS if appropriate.

## 2023-03-15 RX ORDER — FLUCONAZOLE 150 MG/1
TABLET ORAL
Qty: 1 TABLET | Refills: 2 | Status: SHIPPED | OUTPATIENT
Start: 2023-03-15

## 2023-08-30 ENCOUNTER — LAB (OUTPATIENT)
Dept: LAB | Facility: HOSPITAL | Age: 33
End: 2023-08-30
Payer: COMMERCIAL

## 2023-08-30 ENCOUNTER — OFFICE VISIT (OUTPATIENT)
Dept: FAMILY MEDICINE CLINIC | Age: 33
End: 2023-08-30
Payer: COMMERCIAL

## 2023-08-30 VITALS
OXYGEN SATURATION: 97 % | DIASTOLIC BLOOD PRESSURE: 89 MMHG | SYSTOLIC BLOOD PRESSURE: 136 MMHG | BODY MASS INDEX: 24.92 KG/M2 | HEART RATE: 63 BPM | HEIGHT: 64 IN | WEIGHT: 146 LBS

## 2023-08-30 DIAGNOSIS — K21.9 GASTROESOPHAGEAL REFLUX DISEASE WITHOUT ESOPHAGITIS: ICD-10-CM

## 2023-08-30 DIAGNOSIS — Z13.220 SCREENING CHOLESTEROL LEVEL: ICD-10-CM

## 2023-08-30 DIAGNOSIS — Z13.0 SCREENING FOR DEFICIENCY ANEMIA: ICD-10-CM

## 2023-08-30 DIAGNOSIS — F41.9 ANXIETY: Primary | ICD-10-CM

## 2023-08-30 DIAGNOSIS — Z13.29 THYROID DISORDER SCREEN: ICD-10-CM

## 2023-08-30 DIAGNOSIS — Z13.1 DIABETES MELLITUS SCREENING: ICD-10-CM

## 2023-08-30 LAB
ALBUMIN SERPL-MCNC: 4.1 G/DL (ref 3.5–5.2)
ALBUMIN/GLOB SERPL: 1.4 G/DL
ALP SERPL-CCNC: 65 U/L (ref 39–117)
ALT SERPL W P-5'-P-CCNC: 32 U/L (ref 1–33)
ANION GAP SERPL CALCULATED.3IONS-SCNC: 11 MMOL/L (ref 5–15)
AST SERPL-CCNC: 32 U/L (ref 1–32)
BASOPHILS # BLD AUTO: 0.01 10*3/MM3 (ref 0–0.2)
BASOPHILS NFR BLD AUTO: 0.2 % (ref 0–1.5)
BILIRUB SERPL-MCNC: 0.5 MG/DL (ref 0–1.2)
BUN SERPL-MCNC: 12 MG/DL (ref 6–20)
BUN/CREAT SERPL: 14.3 (ref 7–25)
CALCIUM SPEC-SCNC: 8.9 MG/DL (ref 8.6–10.5)
CHLORIDE SERPL-SCNC: 103 MMOL/L (ref 98–107)
CHOLEST SERPL-MCNC: 160 MG/DL (ref 0–200)
CO2 SERPL-SCNC: 26 MMOL/L (ref 22–29)
CREAT SERPL-MCNC: 0.84 MG/DL (ref 0.57–1)
DEPRECATED RDW RBC AUTO: 41.8 FL (ref 37–54)
EGFRCR SERPLBLD CKD-EPI 2021: 93.7 ML/MIN/1.73
EOSINOPHIL # BLD AUTO: 0.1 10*3/MM3 (ref 0–0.4)
EOSINOPHIL NFR BLD AUTO: 2 % (ref 0.3–6.2)
ERYTHROCYTE [DISTWIDTH] IN BLOOD BY AUTOMATED COUNT: 12.5 % (ref 12.3–15.4)
GLOBULIN UR ELPH-MCNC: 2.9 GM/DL
GLUCOSE SERPL-MCNC: 89 MG/DL (ref 65–99)
HCT VFR BLD AUTO: 39.1 % (ref 34–46.6)
HDLC SERPL-MCNC: 69 MG/DL (ref 40–60)
HGB BLD-MCNC: 12.6 G/DL (ref 12–15.9)
IMM GRANULOCYTES # BLD AUTO: 0.01 10*3/MM3 (ref 0–0.05)
IMM GRANULOCYTES NFR BLD AUTO: 0.2 % (ref 0–0.5)
LDLC SERPL CALC-MCNC: 81 MG/DL (ref 0–100)
LDLC/HDLC SERPL: 1.18 {RATIO}
LYMPHOCYTES # BLD AUTO: 1.9 10*3/MM3 (ref 0.7–3.1)
LYMPHOCYTES NFR BLD AUTO: 38.5 % (ref 19.6–45.3)
MCH RBC QN AUTO: 28.8 PG (ref 26.6–33)
MCHC RBC AUTO-ENTMCNC: 32.2 G/DL (ref 31.5–35.7)
MCV RBC AUTO: 89.5 FL (ref 79–97)
MONOCYTES # BLD AUTO: 0.32 10*3/MM3 (ref 0.1–0.9)
MONOCYTES NFR BLD AUTO: 6.5 % (ref 5–12)
NEUTROPHILS NFR BLD AUTO: 2.59 10*3/MM3 (ref 1.7–7)
NEUTROPHILS NFR BLD AUTO: 52.6 % (ref 42.7–76)
PLATELET # BLD AUTO: 238 10*3/MM3 (ref 140–450)
PMV BLD AUTO: 7.8 FL (ref 6–12)
POTASSIUM SERPL-SCNC: 4.5 MMOL/L (ref 3.5–5.2)
PROT SERPL-MCNC: 7 G/DL (ref 6–8.5)
RBC # BLD AUTO: 4.37 10*6/MM3 (ref 3.77–5.28)
SODIUM SERPL-SCNC: 140 MMOL/L (ref 136–145)
TRIGL SERPL-MCNC: 49 MG/DL (ref 0–150)
TSH SERPL DL<=0.05 MIU/L-ACNC: 1.32 UIU/ML (ref 0.27–4.2)
VLDLC SERPL-MCNC: 10 MG/DL (ref 5–40)
WBC NRBC COR # BLD: 4.93 10*3/MM3 (ref 3.4–10.8)

## 2023-08-30 PROCEDURE — 85025 COMPLETE CBC W/AUTO DIFF WBC: CPT

## 2023-08-30 PROCEDURE — 36415 COLL VENOUS BLD VENIPUNCTURE: CPT

## 2023-08-30 PROCEDURE — 80053 COMPREHEN METABOLIC PANEL: CPT

## 2023-08-30 PROCEDURE — 84443 ASSAY THYROID STIM HORMONE: CPT

## 2023-08-30 PROCEDURE — 99214 OFFICE O/P EST MOD 30 MIN: CPT | Performed by: NURSE PRACTITIONER

## 2023-08-30 PROCEDURE — 80061 LIPID PANEL: CPT

## 2023-08-30 RX ORDER — BUPROPION HYDROCHLORIDE 75 MG/1
TABLET ORAL
Qty: 47 TABLET | Refills: 0 | Status: SHIPPED | OUTPATIENT
Start: 2023-08-30 | End: 2023-08-31 | Stop reason: SDUPTHER

## 2023-08-30 NOTE — ASSESSMENT & PLAN NOTE
Discussed reflux triggers and how anxiety can be a reflux trigger.  Discussed possible H. pylori breath test or further testing if acid reflux symptoms or not controlled.  Prilosec is only intended for long-term use in special circumstances.  Discuss histamine blockers versus proton pump inhibitors for management of acid reflux symptoms.

## 2023-08-30 NOTE — PROGRESS NOTES
"Chief Complaint  Medication Problem (Patient states she had been \"scaling back on medicaitons and now having break through anxiety and focusing issues\" )    Subjective          Citlalli Rodriguez presents to [facility]     Patient is a 33-year-old female who is here today to follow-up regarding anxiety.  Is self-employed and having difficulty with focus.  Previous attempted treatments for anxiety have included buspirone which was ineffective at 5 mg dose and cause drowsiness at 10 mg dose.  Sertraline helped for a while but she felt as if it contributed to weight gain so changed to Wellbutrin which helped initially but is not current Dann helping.  Lexapro 5 mg daily was added to the Wellbutrin 300 mg daily with mixed results.  She denies any worsening of symptoms.  Patient would prefer to wean off medication and try Astra Gonda or natural homeopathic remedies for anxiety.  Has not yet tried counseling.  Counseling is encouraged.  Denies fatigue.  Contraception is vasectomy and partner.  Patient headaches improved with stopping birth control pills after her  had vasectomy.  Has regular menstrual cycles.  Acid reflux currently stable on over-the-counter Prilosec or similar antacid.  Denies depression.     Objective   Vital Signs:   Vitals:    08/30/23 0802   BP: 136/89   BP Location: Left arm   Patient Position: Sitting   Cuff Size: Adult   Pulse: 63   SpO2: 97%   Weight: 66.2 kg (146 lb)   Height: 161.3 cm (63.5\")       Wt Readings from Last 3 Encounters:   08/30/23 66.2 kg (146 lb)   02/08/23 65.8 kg (145 lb)   11/08/22 70.8 kg (156 lb)      BP Readings from Last 3 Encounters:   08/30/23 136/89   02/08/23 112/78   11/08/22 142/89       Body mass index is 25.46 kg/mý.           Physical Exam  Vitals reviewed.   Constitutional:       General: She is not in acute distress.     Appearance: Normal appearance. She is well-developed.   Cardiovascular:      Rate and Rhythm: Normal rate and " regular rhythm.      Heart sounds: Normal heart sounds.   Pulmonary:      Effort: Pulmonary effort is normal.      Breath sounds: Normal breath sounds.   Musculoskeletal:      Right lower leg: No edema.      Left lower leg: No edema.   Skin:     General: Skin is warm and dry.   Neurological:      General: No focal deficit present.      Mental Status: She is alert.   Psychiatric:         Attention and Perception: Attention normal.         Mood and Affect: Mood and affect normal.         Behavior: Behavior normal.         Current Outpatient Medications:     atenolol (TENORMIN) 50 MG tablet, Take 1 tablet by mouth Daily., Disp: , Rfl:     escitalopram (Lexapro) 5 MG tablet, Take 1 tablet by mouth Daily., Disp: 30 tablet, Rfl: 11    buPROPion (WELLBUTRIN) 75 MG tablet, Take 2 tablets in the am and 1 tablet in afternoon x 1 week Take 1 tablet twice daily x 1 week Take one half tablet twice daily x 1 week Take one half tablet once daily x 1 week  Take one half tablet every other day x 3 doses then stop, Disp: 47 tablet, Rfl: 0   Past Medical History:   Diagnosis Date    Abnormal ECG     Abnormal Pap smear of cervix     ADHD (attention deficit hyperactivity disorder)     Anxiety     Chlamydia     HPV (human papilloma virus) infection     Migraine     Ovarian cyst     Rh incompatibility     SVT (supraventricular tachycardia)     Varicella      Allergies   Allergen Reactions    Penicillin G Rash    Promethazine Itching, Myalgia, Rash and Hallucinations     Hallucination    Promethazine Hcl Nausea And Vomiting and Rash    Zomig [Zolmitriptan] Nausea Only               Result Review :          No Images in the past 120 days found..           Social History     Tobacco Use   Smoking Status Never   Smokeless Tobacco Never           Assessment and Plan    Diagnoses and all orders for this visit:    1. Anxiety (Primary)  Assessment & Plan:  Call member benefits on your insurance card to determine what counselors are in the area.   Consider Gene[facility]ight testing or further evaluation of anxiety.  Patient wants to wean off Lexapro and Wellbutrin and start natural remedies along with counseling.  She has adequate supply of Lexapro at home currently.  I recommend taking one half of the 5 mg tablet once daily for few days and every other day and stop.  I am sending in regular release Wellbutrin tablets with careful directions on how to wean off Wellbutrin.  She has not had any recent lab work done and I encouraged checking thyroid function and labs to rule out any underlying causes of her symptoms.  Patient is agreeable.  Further treatment pending lab results.    Orders:  -     buPROPion (WELLBUTRIN) 75 MG tablet; Take 2 tablets in the am and 1 tablet in afternoon x 1 week  Take 1 tablet twice daily x 1 week  Take one half tablet twice daily x 1 week  Take one half tablet once daily x 1 week   Take one half tablet every other day x 3 doses then stop  Dispense: 47 tablet; Refill: 0    2. Gastroesophageal reflux disease without esophagitis  Assessment & Plan:  Discussed reflux triggers and how anxiety can be a reflux trigger.  Discussed possible H. pylori breath test or further testing if acid reflux symptoms or not controlled.  Prilosec is only intended for long-term use in special circumstances.  Discuss histamine blockers versus proton pump inhibitors for management of acid reflux symptoms.      3. Screening for deficiency anemia  -     CBC w AUTO Differential; Future    4. Thyroid disorder screen  -     TSH Rfx On Abnormal To Free T4; Future    5. Screening cholesterol level  -     Lipid panel; Future    6. Diabetes mellitus screening  -     Comprehensive metabolic panel; Future        Follow Up    No follow-ups on file.  Patient was given instructions and counseling regarding her condition or for health maintenance advice. Please see specific information pulled into the AVS if appropriate.

## 2023-08-30 NOTE — ASSESSMENT & PLAN NOTE
Call member benefits on your insurance card to determine what counselors are in the area.  Consider GeneSight testing or further evaluation of anxiety.  Patient wants to wean off Lexapro and Wellbutrin and start natural remedies along with counseling.  She has adequate supply of Lexapro at home currently.  I recommend taking one half of the 5 mg tablet once daily for few days and every other day and stop.  I am sending in regular release Wellbutrin tablets with careful directions on how to wean off Wellbutrin.  She has not had any recent lab work done and I encouraged checking thyroid function and labs to rule out any underlying causes of her symptoms.  Patient is agreeable.  Further treatment pending lab results.

## 2023-08-31 ENCOUNTER — TELEPHONE (OUTPATIENT)
Dept: FAMILY MEDICINE CLINIC | Age: 33
End: 2023-08-31
Payer: COMMERCIAL

## 2023-08-31 DIAGNOSIS — F41.9 ANXIETY: ICD-10-CM

## 2023-08-31 RX ORDER — BUPROPION HYDROCHLORIDE 75 MG/1
TABLET ORAL
Qty: 47 TABLET | Refills: 0 | Status: SHIPPED | OUTPATIENT
Start: 2023-08-31 | End: 2023-08-31 | Stop reason: SDUPTHER

## 2023-09-05 NOTE — PROGRESS NOTES
You are not anemic.  HDL is your good cholesterol level.  Lipid panel looks very good.  Blood sugar, kidney, liver, and thyroid function are normal.

## 2023-09-06 DIAGNOSIS — F41.9 ANXIETY: Primary | ICD-10-CM

## 2023-09-09 ENCOUNTER — TELEMEDICINE (OUTPATIENT)
Dept: FAMILY MEDICINE CLINIC | Facility: TELEHEALTH | Age: 33
End: 2023-09-09
Payer: COMMERCIAL

## 2023-09-09 DIAGNOSIS — J01.00 ACUTE MAXILLARY SINUSITIS, RECURRENCE NOT SPECIFIED: Primary | ICD-10-CM

## 2023-09-09 PROCEDURE — 99213 OFFICE O/P EST LOW 20 MIN: CPT | Performed by: NURSE PRACTITIONER

## 2023-09-09 RX ORDER — DOXYCYCLINE HYCLATE 100 MG/1
100 CAPSULE ORAL 2 TIMES DAILY
Qty: 20 CAPSULE | Refills: 0 | Status: SHIPPED | OUTPATIENT
Start: 2023-09-09 | End: 2023-09-19

## 2023-09-09 NOTE — PROGRESS NOTES
CHIEF COMPLAINT  Chief Complaint   Patient presents with    Sinusitis         HPI  Citlalli Rodriguez is a 33 y.o. female  presents with complaint of sinus infection. She had a cold for about 1 1/2 weeks and now pain and thick green mucus has started. She took Doxycycline in January and this resolved the infection.     Review of Systems   Constitutional:  Negative for chills, diaphoresis, fatigue and fever.   HENT:  Positive for postnasal drip, sinus pressure and sinus pain. Negative for congestion, rhinorrhea, sneezing and sore throat.    Respiratory:  Positive for cough. Negative for chest tightness, shortness of breath and wheezing.    Cardiovascular:  Negative for chest pain.   Musculoskeletal:  Negative for myalgias.   Neurological:  Positive for headaches.     Past Medical History:   Diagnosis Date    Abnormal ECG     Abnormal Pap smear of cervix     ADHD (attention deficit hyperactivity disorder)     Anxiety     Chlamydia     HPV (human papilloma virus) infection     Migraine     Ovarian cyst     Rh incompatibility     SVT (supraventricular tachycardia)     Varicella        Family History   Problem Relation Age of Onset    Alcohol abuse Father         Stage 4 liver sorosis    No Known Problems Mother     Breast cancer Neg Hx     Ovarian cancer Neg Hx     Uterine cancer Neg Hx     Colon cancer Neg Hx     Deep vein thrombosis Neg Hx     Pulmonary embolism Neg Hx        Social History     Socioeconomic History    Marital status:    Tobacco Use    Smoking status: Never     Passive exposure: Never    Smokeless tobacco: Never   Vaping Use    Vaping Use: Never used   Substance and Sexual Activity    Alcohol use: Never    Drug use: Never    Sexual activity: Yes     Partners: Male     Birth control/protection: Vasectomy       Citlalli Rodriguez  reports that she has never smoked. She has never been exposed to tobacco smoke. She has never used smokeless tobacco.      LMP 08/24/2023 (Exact Date)   Breastfeeding No      PHYSICAL EXAM  Physical Exam   Constitutional: She is oriented to person, place, and time. She appears well-developed and well-nourished. She does not have a sickly appearance. She does not appear ill. No distress.   HENT:   Head: Normocephalic and atraumatic.   Nose: Right sinus exhibits maxillary sinus tenderness (patient reports). Left sinus exhibits maxillary sinus tenderness (patient reports).   Eyes: EOM are normal.   Neck: Neck normal appearance.  Pulmonary/Chest: Effort normal.  No respiratory distress.  Neurological: She is alert and oriented to person, place, and time.   Skin: Skin is dry.   Psychiatric: She has a normal mood and affect.         Diagnoses and all orders for this visit:    1. Acute maxillary sinusitis, recurrence not specified (Primary)    Other orders  -     doxycycline (VIBRAMYCIN) 100 MG capsule; Take 1 capsule by mouth 2 (Two) Times a Day for 10 days.  Dispense: 20 capsule; Refill: 0        The use of a video visit has been reviewed with the patient and verbal informed consent has been obtained. Myself and Citlalli Rodriguez participated in this visit. The patient is located in [address] [place] [address]. I am located in [place], [place]. [employer] and [employer] were utilized.       Note Disclaimer: At [facility], we believe that sharing information builds trust and better   relationships. You are receiving this note because you recently visited [facility]. It is possible you   will see health information before a provider has talked with you about it. This kind of information can   be easy to misunderstand. To help you fully understand what it means for your health, we urge you to   discuss this note with your provider.    ARELI Rivera  09/09/2023  12:41 EDT

## 2023-09-09 NOTE — PATIENT INSTRUCTIONS
Drink plenty of water  Over the counter pain relievers okay   If symptoms do not improve in 3-5 days follow up with your primary care provider or urgent care  May add over the counter Flonase if symptoms persist     Sinus Infection, Adult  A sinus infection is soreness and swelling (inflammation) of your sinuses. Sinuses are hollow spaces in the bones around your face. They are located:  Around your eyes.  In the middle of your forehead.  Behind your nose.  In your cheekbones.  Your sinuses and nasal passages are lined with a fluid called mucus. Mucus drains out of your sinuses. Swelling can trap mucus in your sinuses. This lets germs (bacteria, virus, or fungus) grow, which leads to infection. Most of the time, this condition is caused by a virus.  What are the causes?  Allergies.  Asthma.  Germs.  Things that block your nose or sinuses.  Growths in the nose (nasal polyps).  Chemicals or irritants in the air.  A fungus. This is rare.  What increases the risk?  Having a weak body defense system (immune system).  Doing a lot of swimming or diving.  Using nasal sprays too much.  Smoking.  What are the signs or symptoms?  The main symptoms of this condition are pain and a feeling of pressure around the sinuses. Other symptoms include:  Stuffy nose (congestion). This may make it hard to breathe through your nose.  Runny nose (drainage).  Soreness, swelling, and warmth in the sinuses.  A cough that may get worse at night.  Being unable to smell and taste.  Mucus that collects in the throat or the back of the nose (postnasal drip). This may cause a sore throat or bad breath.  Being very tired (fatigued).  A fever.  How is this diagnosed?  Your symptoms.  Your medical history.  A physical exam.  Tests to find out if your condition is short-term (acute) or long-term (chronic). Your doctor may:  Check your nose for growths (polyps).  Check your sinuses using a tool that has a light on one end (endoscope).  Check for  allergies or germs.  Do imaging tests, such as an MRI or CT scan.  How is this treated?  Treatment for this condition depends on the cause and whether it is short-term or long-term.  If caused by a virus, your symptoms should go away on their own within 10 days. You may be given medicines to relieve symptoms. They include:  Medicines that shrink swollen tissue in the nose.  A spray that treats swelling of the nostrils.  Rinses that help get rid of thick mucus in your nose (nasal saline washes).  Medicines that treat allergies (antihistamines).  Over-the-counter pain relievers.  If caused by bacteria, your doctor may wait to see if you will get better without treatment. You may be given antibiotic medicine if you have:  A very bad infection.  A weak body defense system.  If caused by growths in the nose, surgery may be needed.  Follow these instructions at home:  Medicines  Take, use, or apply over-the-counter and prescription medicines only as told by your doctor. These may include nasal sprays.  If you were prescribed an antibiotic medicine, take it as told by your doctor. Do not stop taking it even if you start to feel better.  Hydrate and humidify    Drink enough water to keep your pee (urine) pale yellow.  Use a cool mist humidifier to keep the humidity level in your home above 50%.  Breathe in steam for 10-15 minutes, 3-4 times a day, or as told by your doctor. You can do this in the bathroom while a hot shower is running.  Try not to spend time in cool or dry air.  Rest  Rest as much as you can.  Sleep with your head raised (elevated).  Make sure you get enough sleep each night.  General instructions    Put a warm, moist washcloth on your face 3-4 times a day, or as often as told by your doctor.  Use nasal saline washes as often as told by your doctor.  Wash your hands often with soap and water. If you cannot use soap and water, use hand .  Do not smoke. Avoid being around people who are smoking  (secondhand smoke).  Keep all follow-up visits.  Contact a doctor if:  You have a fever.  Your symptoms get worse.  Your symptoms do not get better within 10 days.  Get help right away if:  You have a very bad headache.  You cannot stop vomiting.  You have very bad pain or swelling around your face or eyes.  You have trouble seeing.  You feel confused.  Your neck is stiff.  You have trouble breathing.  These symptoms may be an emergency. Get help right away. Call 911.  Do not wait to see if the symptoms will go away.  Do not drive yourself to the hospital.  Summary  A sinus infection is swelling of your sinuses. Sinuses are hollow spaces in the bones around your face.  This condition is caused by tissues in your nose that become inflamed or swollen. This traps germs. These can lead to infection.  If you were prescribed an antibiotic medicine, take it as told by your doctor. Do not stop taking it even if you start to feel better.  Keep all follow-up visits.  This information is not intended to replace advice given to you by your health care provider. Make sure you discuss any questions you have with your health care provider.  Document Revised: 11/22/2022 Document Reviewed: 11/22/2022  [employer] Patient Education © 2023 [employer]

## 2023-09-13 ENCOUNTER — CLINICAL SUPPORT (OUTPATIENT)
Dept: FAMILY MEDICINE CLINIC | Age: 33
End: 2023-09-13
Payer: COMMERCIAL

## 2023-09-13 DIAGNOSIS — F41.9 ANXIETY: Primary | ICD-10-CM

## 2023-09-19 ENCOUNTER — TELEPHONE (OUTPATIENT)
Dept: FAMILY MEDICINE CLINIC | Age: 33
End: 2023-09-19
Payer: COMMERCIAL

## 2023-09-19 NOTE — TELEPHONE ENCOUNTER
1st attempt - spoke with pt about overdue genesight swab and she stated she came last Thursday to have this completed in the allergy room postponing 3 weeks to wait for results

## 2023-09-20 ENCOUNTER — TELEPHONE (OUTPATIENT)
Dept: FAMILY MEDICINE CLINIC | Age: 33
End: 2023-09-20

## 2023-09-20 NOTE — TELEPHONE ENCOUNTER
Caller: [facility]    Relationship: Other    Best call back number: [phone]  REF# [id number]      Who are you requesting to speak with (clinical staff, provider,  specific staff member): CLINICAL         What was the call regarding: THE SWAB WAS SENT WITH ONE GENE REQUEST IS THIS CORRECT   ALSO INSURANCE INFORMATION IS NEEDED

## 2023-09-29 RX ORDER — FLUCONAZOLE 150 MG/1
TABLET ORAL
Qty: 1 TABLET | Refills: 2 | Status: SHIPPED | OUTPATIENT
Start: 2023-09-29

## 2023-10-10 ENCOUNTER — TELEPHONE (OUTPATIENT)
Dept: FAMILY MEDICINE CLINIC | Age: 33
End: 2023-10-10
Payer: COMMERCIAL

## 2023-10-10 NOTE — TELEPHONE ENCOUNTER
Received IEMO testing.  Provide copy to patient.  Does not metabolize Wellbutrin, Zoloft, Lexapro, Celexa, Effexor or buspirone most effectively.  Medications that she would metabolize best include Pristiq, Trintellix, or amitriptyline among others.  When she reviews report the medications and the use as directed call him would be most advised.

## 2024-01-15 ENCOUNTER — TELEMEDICINE (OUTPATIENT)
Dept: FAMILY MEDICINE CLINIC | Facility: TELEHEALTH | Age: 34
End: 2024-01-15
Payer: COMMERCIAL

## 2024-01-15 DIAGNOSIS — J02.9 ACUTE PHARYNGITIS, UNSPECIFIED ETIOLOGY: Primary | ICD-10-CM

## 2024-01-15 PROCEDURE — 99213 OFFICE O/P EST LOW 20 MIN: CPT | Performed by: NURSE PRACTITIONER

## 2024-01-15 RX ORDER — BUPROPION HYDROCHLORIDE 300 MG/1
1 TABLET ORAL DAILY
COMMUNITY
Start: 2023-12-20

## 2024-01-15 RX ORDER — METHYLPHENIDATE HYDROCHLORIDE 36 MG/1
36 TABLET ORAL EVERY MORNING
COMMUNITY
Start: 2023-12-29

## 2024-01-15 RX ORDER — AZITHROMYCIN 250 MG/1
TABLET, FILM COATED ORAL
Qty: 6 TABLET | Refills: 0 | Status: SHIPPED | OUTPATIENT
Start: 2024-01-15

## 2024-01-15 NOTE — PROGRESS NOTES
"CHIEF COMPLAINT  Chief Complaint   Patient presents with    Sore Throat         HPI  Citlalli Rodriguez is a 33 y.o. female  presents with complaint of Sore throat with white blisters painful swallowing.\" Her son had strep throat last week and she feels she has this now.     Review of Systems   Constitutional:  Negative for chills, diaphoresis, fatigue and fever.   HENT:  Positive for congestion and sore throat. Negative for postnasal drip, rhinorrhea, sinus pressure, sinus pain and sneezing.    Respiratory:  Negative for cough.    Gastrointestinal:  Negative for diarrhea, nausea and vomiting.   Musculoskeletal:  Negative for myalgias.   Neurological:  Negative for headaches.       Past Medical History:   Diagnosis Date    Abnormal ECG     Abnormal Pap smear of cervix     ADHD (attention deficit hyperactivity disorder)     Anxiety     Chlamydia     HPV (human papilloma virus) infection     Migraine     Ovarian cyst     Rh incompatibility     SVT (supraventricular tachycardia)     Varicella        Family History   Problem Relation Age of Onset    Alcohol abuse Father         Stage 4 liver sorosis    No Known Problems Mother     Breast cancer Neg Hx     Ovarian cancer Neg Hx     Uterine cancer Neg Hx     Colon cancer Neg Hx     Deep vein thrombosis Neg Hx     Pulmonary embolism Neg Hx        Social History     Socioeconomic History    Marital status:    Tobacco Use    Smoking status: Never     Passive exposure: Never    Smokeless tobacco: Never   Vaping Use    Vaping Use: Never used   Substance and Sexual Activity    Alcohol use: Never    Drug use: Never    Sexual activity: Yes     Partners: Male     Birth control/protection: Vasectomy       Citlalli Rodriguez  reports that she has never smoked. She has never been exposed to tobacco smoke. She has never used smokeless tobacco..      LMP 12/28/2023 (Exact Date)   Breastfeeding No     PHYSICAL EXAM  Physical Exam   Constitutional: She is oriented to person, place, and time. " She appears well-developed and well-nourished. She does not have a sickly appearance. She does not appear ill. No distress.   HENT:   Head: Normocephalic and atraumatic.   Mouth/Throat: Mouth/Lips are normal.Uvula is midline and oropharynx is clear and moist. Mucous membranes are not pale, not dry, not cyanotic and erythematous. No tonsillar exudate. no white patchesblistered.  Eyes: EOM are normal.   Pulmonary/Chest: Effort normal.  No respiratory distress.  Lymphadenopathy:     She has cervical adenopathy (per patient).   Neurological: She is alert and oriented to person, place, and time.   Skin: Skin is dry.   Psychiatric: She has a normal mood and affect.           Diagnoses and all orders for this visit:    1. Acute pharyngitis, unspecified etiology (Primary)    Other orders  -     azithromycin (Zithromax Z-Luis) 250 MG tablet; Take 2 tablets by mouth on day 1, then 1 tablet daily on days 2-5  Dispense: 6 tablet; Refill: 0        The use of a video visit has been reviewed with the patient and verbal informed consent has been obtained. Myself and Citlalli Rodriguez participated in this visit. The patient is located in [address] [place] [address]. I am located in [place], [place]. [employer] and [employer] were utilized.       Note Disclaimer: At [facility], we believe that sharing information builds trust and better   relationships. You are receiving this note because you recently visited [facility]. It is possible you   will see health information before a provider has talked with you about it. This kind of information can   be easy to misunderstand. To help you fully understand what it means for your health, we urge you to   discuss this note with your provider.    Adwoa Gutierrez, ARELI  01/15/2024  08:49 EST

## 2024-01-15 NOTE — PATIENT INSTRUCTIONS
Drink plenty of water  Over the counter pain relievers okay   If symptoms do not improve in 3-5 days follow up with your primary care provider or urgent care  If you develop worsening nasal congestion, runny nose, cough or other cold symptoms, the sore throat maybe viral.        Pharyngitis  Pharyngitis is a sore throat (pharynx). This is when there is redness, pain, and swelling in your throat. Most of the time, this condition gets better on its own. In some cases, you may need medicine.  What are the causes?  An infection from a virus.  An infection from bacteria.  Allergies.  What increases the risk?  Being 5-24 years old.  Being in crowded environments. These include:  Daycares.  Schools.  Dormitories.  Living in a place with cold temperatures outside.  Having a weakened disease-fighting (immune) system.  What are the signs or symptoms?  Symptoms may vary depending on the cause. Common symptoms include:  Sore throat.  Tiredness (fatigue).  Low-grade fever.  Stuffy nose.  Cough.  Headache.  Other symptoms may include:  Glands in the neck (lymph nodes) that are swollen.  Skin rashes.  Film on the throat or tonsils. This can be caused by an infection from bacteria.  Vomiting.  Red, itchy eyes.  Loss of appetite.  Joint pain and muscle aches.  Tonsils that are temporarily bigger than usual (enlarged).  How is this treated?  Many times, treatment is not needed. This condition usually gets better in 3-4 days without treatment.  If the infection is caused by a bacteria, you may be need to take antibiotics.  Follow these instructions at home:  Medicines  Take over-the-counter and prescription medicines only as told by your doctor.  If you were prescribed an antibiotic medicine, take it as told by your doctor. Do not stop taking the antibiotic even if you start to feel better.  Use throat lozenges or sprays to soothe your throat as told by your doctor.  Children can get pharyngitis. Do not give your child  aspirin.  Managing pain  To help with pain, try:  Sipping warm liquids, such as:  Broth.  Herbal tea.  Warm water.  Eating or drinking cold or frozen liquids, such as frozen ice pops.  Rinsing your mouth (gargle) with a salt water mixture 3-4 times a day or as needed.  To make salt water, dissolve ½-1 tsp (3-6 g) of salt in 1 cup (237 mL) of warm water.  Do not swallow this mixture.  Sucking on hard candy or throat lozenges.  Putting a cool-mist humidifier in your bedroom at night to moisten the air.  Sitting in the bathroom with the door closed for 5-10 minutes while you run hot water in the shower.     General instructions  Do not smoke or use any products that contain nicotine or tobacco. If you need help quitting, ask your doctor.  Rest as told by your doctor.  Drink enough fluid to keep your pee (urine) pale yellow.  How is this prevented?  Wash your hands often for at least 20 seconds with soap and water. If soap and water are not available, use hand .  Do not touch your eyes, nose, or mouth with unwashed hands. Wash hands after touching these areas.  Do not share cups or eating utensils.  Avoid close contact with people who are sick.  Contact a doctor if:  You have large, tender lumps in your neck.  You have a rash.  You cough up green, yellow-brown, or bloody spit.  Get help right away if:  You have a stiff neck.  You drool or cannot swallow liquids.  You cannot drink or take medicines without vomiting.  You have very bad pain that does not go away with medicine.  You have problems breathing, and it is not from a stuffy nose.  You have new pain and swelling in your knees, ankles, wrists, or elbows.  These symptoms may be an emergency. Get help right away. Call your local emergency services (911 in the [place]  Do not wait to see if the symptoms will go away.  Do not drive yourself to the hospital.  Summary  Pharyngitis is a sore throat (pharynx). This is when there is redness, pain, and swelling in  your throat.  Most of the time, pharyngitis gets better on its own. Sometimes, you may need medicine.  If you were prescribed an antibiotic medicine, take it as told by your doctor. Do not stop taking the antibiotic even if you start to feel better.  This information is not intended to replace advice given to you by your health care provider. Make sure you discuss any questions you have with your health care provider.  Document Revised: 03/16/2022 Document Reviewed: 03/16/2022  Elsevier Patient Education © 2023 [employer]

## 2024-07-01 RX ORDER — FLUCONAZOLE 150 MG/1
TABLET ORAL
Qty: 1 TABLET | Refills: 2 | OUTPATIENT
Start: 2024-07-01

## 2024-07-01 NOTE — TELEPHONE ENCOUNTER
Dr Wiseman has not seen your for an annual exam since 10/11/2022. Please let me know when I can help you schedule your annual exam with him or call the office at [phone] to schedule your annual. We look forward to hearing from you. My Chart message sent.

## 2024-07-17 ENCOUNTER — TELEMEDICINE (OUTPATIENT)
Dept: FAMILY MEDICINE CLINIC | Facility: TELEHEALTH | Age: 34
End: 2024-07-17
Payer: COMMERCIAL

## 2024-07-17 DIAGNOSIS — B00.1 COLD SORE: Primary | ICD-10-CM

## 2024-07-17 RX ORDER — METHYLPHENIDATE HYDROCHLORIDE 30 MG/1
CAPSULE, EXTENDED RELEASE ORAL
COMMUNITY
Start: 2024-06-04

## 2024-07-17 RX ORDER — BUPROPION HYDROCHLORIDE 150 MG/1
TABLET ORAL
COMMUNITY
Start: 2024-06-12

## 2024-07-17 RX ORDER — METHYLPHENIDATE HYDROCHLORIDE 54 MG/1
TABLET ORAL
COMMUNITY
Start: 2024-04-26

## 2024-07-17 RX ORDER — VALACYCLOVIR HYDROCHLORIDE 1 G/1
1000 TABLET, FILM COATED ORAL 2 TIMES DAILY
Qty: 14 TABLET | Refills: 0 | Status: SHIPPED | OUTPATIENT
Start: 2024-07-17 | End: 2024-07-24

## 2024-07-17 RX ORDER — METHYLPHENIDATE HYDROCHLORIDE 5 MG/1
TABLET ORAL
COMMUNITY
Start: 2024-06-12

## 2024-07-17 RX ORDER — METHYLPHENIDATE HYDROCHLORIDE 10 MG/1
TABLET ORAL
COMMUNITY
Start: 2024-06-12

## 2024-07-17 NOTE — PROGRESS NOTES
Subjective   Chief Complaint   Patient presents with    Mouth Lesions       Citlalli Rodriguez is a 34 y.o. female.     History of Present Illness  Patient reports having a cold sore at the left corner of her mouth for the past 4 to 5 days.  She has been using Abreva and menthol topical over-the-counter medicines with no relief.  She has a history of cold sores and has used Valtrex in the past.  Mouth Lesions   The current episode started 3 to 5 days ago. The problem has been unchanged. Associated symptoms include mouth sores. Pertinent negatives include no fever, no decreased vision, no double vision, no eye itching, no photophobia, no congestion, no ear discharge, no ear pain, no headaches, no hearing loss, no rhinorrhea, no sore throat, no stridor, no swollen glands, no eye discharge, no eye pain and no eye redness.        Allergies   Allergen Reactions    Penicillin G Rash    Promethazine Itching, Myalgia, Rash and Hallucinations     Hallucination    Promethazine Hcl Nausea And Vomiting and Rash    Zomig [Zolmitriptan] Nausea Only       Past Medical History:   Diagnosis Date    Abnormal ECG     Abnormal Pap smear of cervix     ADHD (attention deficit hyperactivity disorder)     Anxiety     Chlamydia     HPV (human papilloma virus) infection     Migraine     Ovarian cyst     Rh incompatibility     SVT (supraventricular tachycardia)     Varicella        Past Surgical History:   Procedure Laterality Date    COSMETIC SURGERY  April 2022    Silicone breast implants    GYNECOLOGIC CRYOSURGERY      WISDOM TOOTH EXTRACTION         Social History     Socioeconomic History    Marital status:    Tobacco Use    Smoking status: Never     Passive exposure: Never    Smokeless tobacco: Never   Vaping Use    Vaping status: Never Used   Substance and Sexual Activity    Alcohol use: Never    Drug use: Never    Sexual activity: Yes     Partners: Male     Birth control/protection: Vasectomy       Family History   Problem Relation  Age of Onset    Alcohol abuse Father         Stage 4 liver sorosis    No Known Problems Mother     Breast cancer Neg Hx     Ovarian cancer Neg Hx     Uterine cancer Neg Hx     Colon cancer Neg Hx     Deep vein thrombosis Neg Hx     Pulmonary embolism Neg Hx          Current Outpatient Medications:     buPROPion XL (WELLBUTRIN XL) 150 MG 24 hr tablet, TAKE 1 TABLET BY MOUTH WITH 300MG TABLET EVERY DAY, Disp: , Rfl:     methylphenidate (RITALIN) 10 MG tablet, TAKE 1 TABLET BY MOUTH MIDDAY WITH 5 MG FOR A TOTAL OF 15 MG, Disp: , Rfl:     methylphenidate (RITALIN) 5 MG tablet, TAKE 1 TABLET BY MOUTH MIDDAY WITH 10 MG FOR A TOTAL OF 15 MG, Disp: , Rfl:     methylphenidate 54 MG CR tablet, TAKE 1 TABLET BY MOUTH IN THE MORNING AS NEEDED FOR ADHD, Disp: , Rfl:     methylphenidate LA (RITALIN LA) 30 MG 24 hr capsule, TAKE 1 CAPSULE BY MOUTH IN THE MORNING AS NEEDED FOR ADHD, Disp: , Rfl:     atenolol (TENORMIN) 50 MG tablet, Take 1 tablet by mouth Daily., Disp: , Rfl:     buPROPion XL (WELLBUTRIN XL) 300 MG 24 hr tablet, Take 1 tablet by mouth Daily., Disp: , Rfl:     methylphenidate 36 MG CR tablet, Take 1 tablet by mouth Every Morning, Disp: , Rfl:     valACYclovir (Valtrex) 1000 MG tablet, Take 1 tablet by mouth 2 (Two) Times a Day for 7 days., Disp: 14 tablet, Rfl: 0      Review of Systems   Constitutional:  Negative for chills, diaphoresis, fatigue and fever.   HENT:  Positive for mouth sores. Negative for congestion, ear discharge, ear pain, hearing loss, rhinorrhea, sore throat and swollen glands.    Eyes:  Negative for double vision, photophobia, pain, discharge, redness and itching.   Respiratory:  Negative for stridor.         There were no vitals filed for this visit.    Objective   Physical Exam  Constitutional:       General: She is not in acute distress.     Appearance: Normal appearance. She is not ill-appearing, toxic-appearing or diaphoretic.   HENT:      Head: Normocephalic.      Mouth/Throat:      Lips:  Pink. Lesions present.      Mouth: Mucous membranes are moist.     Pulmonary:      Effort: Pulmonary effort is normal.   Neurological:      Mental Status: She is alert and oriented to person, place, and time.          Procedures     Assessment & Plan   Diagnoses and all orders for this visit:    1. Cold sore (Primary)  -     valACYclovir (Valtrex) 1000 MG tablet; Take 1 tablet by mouth 2 (Two) Times a Day for 7 days.  Dispense: 14 tablet; Refill: 0            PLAN: Discussed dosing, side effects, recommended other symptomatic care.  Patient should follow up with primary care provider, Urgent Care or ER if symptoms worsen, fail to resolve or other symptoms need attention. Patient/family agree to the above.         ARELI Benson     The use of a video visit has been reviewed with the patient and verbal informed consent has been obtained. Myself and Citlalli Rodriguez participated in this visit. The patient is located at [address] [place] [address]. I am located in [place], [place]. Mychart and Zoom were utilized.        This visit was performed via Telehealth.  This patient has been instructed to follow-up with their primary care provider if their symptoms worsen or the treatment provided does not resolve their illness.

## 2024-11-30 ENCOUNTER — TELEMEDICINE (OUTPATIENT)
Dept: FAMILY MEDICINE CLINIC | Facility: TELEHEALTH | Age: 34
End: 2024-11-30
Payer: COMMERCIAL

## 2024-11-30 DIAGNOSIS — B00.1 COLD SORE: Primary | ICD-10-CM

## 2024-11-30 PROCEDURE — 99213 OFFICE O/P EST LOW 20 MIN: CPT | Performed by: NURSE PRACTITIONER

## 2024-11-30 RX ORDER — VALACYCLOVIR HYDROCHLORIDE 1 G/1
2000 TABLET, FILM COATED ORAL 2 TIMES DAILY
Qty: 4 TABLET | Refills: 0 | Status: SHIPPED | OUTPATIENT
Start: 2024-11-30 | End: 2024-12-01

## 2024-11-30 NOTE — PROGRESS NOTES
CHIEF COMPLAINT  Chief Complaint   Patient presents with    Rash         HPI  Citlalli Rodriguez is a 34 y.o. female  presents with complaint of cold sore on the right lower lip and chin. She has one on the left lower lip and has been using topical treatment with improvements and then she broke out on the other side of her lip. She has an event coming up and needs help clearing these lesions. She is having no cold symptoms, but has had some stress this month.     Review of Systems   Constitutional:  Negative for chills, diaphoresis, fatigue and fever.   HENT:  Positive for mouth sores. Negative for congestion, sinus pressure, sinus pain and sneezing.    Respiratory:  Negative for cough.    Neurological:  Negative for headaches.       Past Medical History:   Diagnosis Date    Abnormal ECG     Abnormal Pap smear of cervix     ADHD (attention deficit hyperactivity disorder)     Anxiety     Chlamydia     HPV (human papilloma virus) infection     Migraine     Ovarian cyst     Rh incompatibility     SVT (supraventricular tachycardia)     Varicella        Family History   Problem Relation Age of Onset    Alcohol abuse Father         Stage 4 liver sorosis    No Known Problems Mother     Breast cancer Neg Hx     Ovarian cancer Neg Hx     Uterine cancer Neg Hx     Colon cancer Neg Hx     Deep vein thrombosis Neg Hx     Pulmonary embolism Neg Hx        Social History     Socioeconomic History    Marital status:    Tobacco Use    Smoking status: Never     Passive exposure: Never    Smokeless tobacco: Never   Vaping Use    Vaping status: Never Used   Substance and Sexual Activity    Alcohol use: Never    Drug use: Never    Sexual activity: Yes     Partners: Male     Birth control/protection: Vasectomy         LMP 11/10/2024   Breastfeeding No     PHYSICAL EXAM  Physical Exam   Constitutional: She is oriented to person, place, and time. She appears well-developed and well-nourished. She does not have a sickly appearance. She  does not appear ill. No distress.   HENT:   Head: Normocephalic and atraumatic.   Eyes: EOM are normal.   Neck: Neck normal appearance.  Pulmonary/Chest: Effort normal.  No respiratory distress.  Neurological: She is alert and oriented to person, place, and time.   Skin: Skin is dry. Rash (vesicular rash on right lower lip and one lesion appearing on right upper chin.) noted.   Psychiatric: She has a normal mood and affect.           Diagnoses and all orders for this visit:    1. Cold sore (Primary)    Other orders  -     valACYclovir (Valtrex) 1000 MG tablet; Take 2 tablets by mouth 2 (Two) Times a Day for 1 day.  Dispense: 4 tablet; Refill: 0        Mode of visit: Video   Myself and Citlalli Rodriguez participated in this visit. The patient is located in [address] [place] [address]. I am located in [place], [place]. Mychart and Twilio were utilized.   You have chosen to receive care through a telehealth visit.    You have chosen to receive care through a telehealth visit.   Does the patient consent to use a video/audio connection for your medical care today? Yes       Note Disclaimer: At [facility], we believe that sharing information builds trust and better   relationships. You are receiving this note because you recently visited [facility]. It is possible you   will see health information before a provider has talked with you about it. This kind of information can   be easy to misunderstand. To help you fully understand what it means for your health, we urge you to   discuss this note with your provider.    Adwoa Gutierrez, ARELI  11/30/2024  11:54 EST

## 2024-11-30 NOTE — PATIENT INSTRUCTIONS
Wash hands before and after touching area. Do not pick at area. You can transfer the virus to other areas including your eyes. You may use the topical ointment on the lip for comfort.   If symptoms do not improve in 3 days, follow up for an in person evaluation by your primary care provider or urgent care.        Cold Sore  A cold sore, also called a fever blister, is a small, fluid-filled sore that forms inside of the mouth or on the lips, gums, nose, chin, or cheeks. Cold sores can spread to other parts of the body, such as the eyes, fingers, or genitals.  Cold sores can spread from person to person (are contagious) until the sores crust over completely. Most cold sores go away within 2 weeks.  What are the causes?  Cold sores are caused by a virus (herpes simplex virus type 1, HSV-1). The virus can spread from person to person through close contact, such as through:  Kissing.  Touching the affected area.  Sharing personal items such as lip balm, razors, a drinking glass, or eating utensils.  What increases the risk?  Being tired, stressed, or sick.  Having your period (menstruating).  Being pregnant.  Taking certain medicines.  Being out in cold weather or getting too much sun.  What are the signs or symptoms?  Symptoms of a cold sore go through different stages:  Tingling, itching, or burning is felt 1-2 days before the cold sore appears.  Fluid-filled blisters appear on the lips, inside the mouth, on the nose, or on the cheeks.  The blisters start to ooze clear fluid.  The blisters dry up, and a yellow crust appears in their place.  The crust falls off.  In some cases, other symptoms can develop along with cold sores. These can include:  Fever.  Sore throat.  Headache.  Muscle aches.  Swollen neck glands.  How is this treated?  There is no cure for cold sores or the virus that causes them. There is also no vaccine to prevent the virus. Most cold sores go away on their own without treatment within 2 weeks. Your  doctor may prescribe medicines to:  Help with pain.  Keep the virus from growing.  Help you heal faster.  Medicines may be in the form of creams, gels, pills, or a shot.  Follow these instructions at home:  Medicines  Take or apply over-the-counter and prescription medicines only as told by your doctor.  Use a cotton-tip swab to apply creams or gels to your sores.  Ask your doctor if you can take lysine supplements. These may help with healing.  Sore care  Do not touch the sores or pick the scabs.  Wash your hands often with soap and water for at least 20 seconds. Do not touch your eyes without washing your hands first.  Keep the sores clean and dry.  If told, put ice on the sores. To do this:  Put ice in a plastic bag.  Place a towel between your skin and the bag.  Leave the ice on for 20 minutes, 2-3 times a day.  Take off the ice if your skin turns bright red. This is very important. If you cannot feel pain, heat, or cold, you have a greater risk of damage to the area.  Eating and drinking  Eat a soft, bland diet. Avoid eating hot, cold, or salty foods. These can hurt your mouth.  Use a straw if it hurts to drink out of a glass.  Eat foods that have a lot of lysine in them. These include meat, fish, and dairy products.  Avoid sugary foods, chocolates, nuts, and grains. These foods have a high amount of a substance (arginine) that can cause the virus to grow.  Lifestyle  Do not kiss, have oral sex, or share personal items until your sores heal.  Stress, poor sleep, and being out in the sun can trigger a cold sore. Make sure you:  Do activities that help you relax, such as deep breathing exercises or meditation.  Get enough sleep.  Put sunscreen on your lips before you go out in the sun.  Contact a doctor if:  You have symptoms for more than 2 weeks.  You have pus coming from the sores.  You have redness that is spreading.  You have pain or irritation in your eye.  You get sores on your genitals.  Your sores do not  heal within 2 weeks.  You get cold sores often.  Get help right away if:  You have a fever and your symptoms suddenly get worse.  You have a headache and confusion.  You have tiredness (fatigue).  You do not want to eat as much as normal (loss of appetite).  You have a stiff neck or are sensitive to light.  Summary  A cold sore is a small, fluid-filled sore that forms inside of the mouth or on the lips, gums, nose, chin, or cheeks.  Cold sores can spread from person to person (are contagious) until the sores crust over completely. Most cold sores go away within 2 weeks.  Wash your hands often. Do not touch your eyes without washing your hands first.  Do not kiss, have oral sex, or share personal items until your sores heal.  Contact a doctor if your sores do not heal within 2 weeks.  This information is not intended to replace advice given to you by your health care provider. Make sure you discuss any questions you have with your health care provider.  Document Revised: 09/28/2022 Document Reviewed: 09/28/2022  Elsevier Patient Education © 2024 [employer]